# Patient Record
Sex: MALE | Race: WHITE | NOT HISPANIC OR LATINO | Employment: FULL TIME | URBAN - METROPOLITAN AREA
[De-identification: names, ages, dates, MRNs, and addresses within clinical notes are randomized per-mention and may not be internally consistent; named-entity substitution may affect disease eponyms.]

---

## 2017-01-20 ENCOUNTER — GENERIC CONVERSION - ENCOUNTER (OUTPATIENT)
Dept: OTHER | Facility: OTHER | Age: 52
End: 2017-01-20

## 2017-02-22 ENCOUNTER — GENERIC CONVERSION - ENCOUNTER (OUTPATIENT)
Dept: OTHER | Facility: OTHER | Age: 52
End: 2017-02-22

## 2017-05-18 ENCOUNTER — ALLSCRIPTS OFFICE VISIT (OUTPATIENT)
Dept: OTHER | Facility: OTHER | Age: 52
End: 2017-05-18

## 2017-05-18 DIAGNOSIS — Z13.29 ENCOUNTER FOR SCREENING FOR OTHER SUSPECTED ENDOCRINE DISORDER: ICD-10-CM

## 2017-05-18 DIAGNOSIS — Z13.220 ENCOUNTER FOR SCREENING FOR LIPOID DISORDERS: ICD-10-CM

## 2017-05-18 DIAGNOSIS — E29.1 TESTICULAR HYPOFUNCTION: ICD-10-CM

## 2017-08-15 ENCOUNTER — GENERIC CONVERSION - ENCOUNTER (OUTPATIENT)
Dept: OTHER | Facility: OTHER | Age: 52
End: 2017-08-15

## 2017-08-15 LAB
A/G RATIO (HISTORICAL): 1.8 (ref 1.2–2.2)
ALBUMIN SERPL BCP-MCNC: 4.4 G/DL (ref 3.5–5.5)
ALP SERPL-CCNC: 89 IU/L (ref 39–117)
ALT SERPL W P-5'-P-CCNC: 30 IU/L (ref 0–44)
AMBIG ABBREV CMP14 DEFAULT (HISTORICAL): NORMAL
AMBIG ABBREV LP DEFAULT (HISTORICAL): NORMAL
AST SERPL W P-5'-P-CCNC: 26 IU/L (ref 0–40)
BILIRUB SERPL-MCNC: 0.8 MG/DL (ref 0–1.2)
BUN SERPL-MCNC: 12 MG/DL (ref 6–24)
BUN/CREA RATIO (HISTORICAL): 13 (ref 9–20)
CALCIUM SERPL-MCNC: 9.4 MG/DL (ref 8.7–10.2)
CHLORIDE SERPL-SCNC: 99 MMOL/L (ref 96–106)
CHOLEST SERPL-MCNC: 156 MG/DL (ref 100–199)
CO2 SERPL-SCNC: 21 MMOL/L (ref 18–29)
CREAT SERPL-MCNC: 0.96 MG/DL (ref 0.76–1.27)
EGFR AFRICAN AMERICAN (HISTORICAL): 104 ML/MIN/1.73
EGFR-AMERICAN CALC (HISTORICAL): 90 ML/MIN/1.73
GLUCOSE SERPL-MCNC: 100 MG/DL (ref 65–99)
HDLC SERPL-MCNC: 51 MG/DL
LDL/HDL RATIO (HISTORICAL): 1.7 RATIO UNITS (ref 0–3.6)
LDLC SERPL CALC-MCNC: 87 MG/DL (ref 0–99)
POTASSIUM SERPL-SCNC: 3.8 MMOL/L (ref 3.5–5.2)
SODIUM SERPL-SCNC: 139 MMOL/L (ref 134–144)
TOT. GLOBULIN, SERUM (HISTORICAL): 2.5 G/DL (ref 1.5–4.5)
TOTAL PROTEIN (HISTORICAL): 6.9 G/DL (ref 6–8.5)
TRIGL SERPL-MCNC: 90 MG/DL (ref 0–149)
VLDLC SERPL CALC-MCNC: 18 MG/DL (ref 5–40)

## 2017-08-16 ENCOUNTER — ALLSCRIPTS OFFICE VISIT (OUTPATIENT)
Dept: OTHER | Facility: OTHER | Age: 52
End: 2017-08-16

## 2017-08-16 LAB — TSH SERPL DL<=0.05 MIU/L-ACNC: 2 UIU/ML (ref 0.45–4.5)

## 2017-08-17 ENCOUNTER — GENERIC CONVERSION - ENCOUNTER (OUTPATIENT)
Dept: OTHER | Facility: OTHER | Age: 52
End: 2017-08-17

## 2017-08-18 LAB
TESTOSTERONE FREE (HISTORICAL): 8.1 PG/ML (ref 7.2–24)
TESTOSTERONE TOTAL (HISTORICAL): 438.2 NG/DL (ref 264–916)

## 2017-08-21 ENCOUNTER — GENERIC CONVERSION - ENCOUNTER (OUTPATIENT)
Dept: OTHER | Facility: OTHER | Age: 52
End: 2017-08-21

## 2017-10-04 ENCOUNTER — GENERIC CONVERSION - ENCOUNTER (OUTPATIENT)
Dept: OTHER | Facility: OTHER | Age: 52
End: 2017-10-04

## 2018-01-12 NOTE — RESULT NOTES
Verified Results  (1) COMPREHENSIVE METABOLIC PANEL [id number] 53:43BH [facility]     Test Name Result Flag Reference   Glucose, Serum 100 mg/dL H 65-99   BUN 12 mg/dL  6-24   Creatinine, Serum 0 96 mg/dL  0 76-1 27   BUN/Creatinine Ratio 13  9-20   Sodium, Serum 139 mmol/L  134-144   Potassium, Serum 3 8 mmol/L  3 5-5 2   Chloride, Serum 99 mmol/L     Carbon Dioxide, Total 21 mmol/L  18-29   Calcium, Serum 9 4 mg/dL  8 7-10 2   Protein, Total, Serum 6 9 g/dL  6 0-8 5   Albumin, Serum 4 4 g/dL  3 5-5 5   Globulin, Total 2 5 g/dL  1 5-4 5   A/G Ratio 1 8  1 2-2 2   Bilirubin, Total 0 8 mg/dL  0 0-1 2   Alkaline Phosphatase, S 89 IU/L     AST (SGOT) 26 IU/L  0-40   ALT (SGPT) 30 IU/L  0-44   eGFR If NonAfricn Am 90 mL/min/1 73  >59   eGFR If Africn Am 104 mL/min/1 73  >59

## 2018-01-13 VITALS
WEIGHT: 216 LBS | HEART RATE: 78 BPM | HEIGHT: 69 IN | OXYGEN SATURATION: 98 % | BODY MASS INDEX: 31.99 KG/M2 | TEMPERATURE: 98.8 F

## 2018-01-13 NOTE — RESULT NOTES
Discussion/Summary   Please let the patient know that his testosterone level is normal      Verified Results  (1) TESTOSTERONE, FREE (DIRECT) AND TOTAL [id number] 08:10AM Gilford Falling courtesy copy of this report has been sent to  the patient  Test Name Result Flag Reference   Testosterone, Total, LC/ 2 ng/dL  264 0-916 0   This [facility] LC/MS-MS method is currently certified by the [facility]  Hormone Standardization Program (HoSt)  Adult male reference  interval is based on a population of healthy nonobese males  (BMI <30) between 23and 44years old  [address] [place] [address], [address] [place] [address]  [phone]  PMID: [id number]  This test was developed and its performance characteristics  determined by [facility]  It has not been cleared or approved  by the Food and Drug Administration     Free Testosterone(Direct) 8 1 pg/mL  7 2-24 0

## 2018-01-13 NOTE — PROGRESS NOTES
Chief Complaint  PATIENT IS HERE FOR HEP B VACCINE  ALL INFORMATION HAS BEEN DOCUMENTED  PATIENT TOLERATED WELL  Active Problems    1  Benign essential hypertension (401 1) (I10)   2  Depression (311) (F32 9)   3  Diabetes mellitus screening (V77 1) (Z13 1)   4  Diverticulosis (562 10) (K57 90)   5  Hemochromatosis type 1 (275 01) (E83 110)   6  Hypogonadism   7  Lipid screening (V77 91) (Z13 220)   8  Low testosterone level in male (257 2) (E29 1)   9  Need for hepatitis B vaccination (V05 3) (Z23)   10  Nicotine dependence (305 1) (F17 200)   11  Polycythemia vera (238 4) (D45)   12  Thyroid disorder screen (V77 0) (Z13 29)    Current Meds   1  AmLODIPine Besylate 5 MG Oral Tablet; Take one tablet daily; Therapy: (Recorded:58Kog3860) to Recorded   2  Chantix Continuing Month Dany 1 MG Oral Tablet; TAKE 1 TABLET TWICE DAILY; Therapy: 40WVQ7769 to (Evaluate:08Zba7158)  Requested for: 06ZJW3579; Last   Rx:93Zjc3744 Ordered   3  Chantix Starting Month Dany 0 5 MG X 11 & 1 MG X 42 Oral Tablet; TAKE AS DIRECTED   PER PACKAGE INSTRUCTIONS; Therapy: 32VKC8459 to (Last Rx:42Jcz6945)  Requested for: 11DOS2892 Ordered   4  Cialis 5 MG Oral Tablet; TAKE 1 TABLET DAILY 1 HOUR BEFORE NEEDED  Requested   for: 16ULS8315; Last Rx:41Swn0356 Ordered   5  DULoxetine HCl - 60 MG Oral Capsule Delayed Release Particles; TAKE ONE (1)   CAPSULE(S) DAILY; Therapy: (Flavio Hicks) to Recorded   6  HydroCHLOROthiazide 25 MG Oral Tablet; TAKE 1 TABLET DAILY; Therapy: (Recorded:66Mlc7538) to Recorded    Allergies    1  Penicillins    Plan  Need for hepatitis B vaccination    · Hepatitis B    Discussion/Summary    Hep b vaccine given        Signatures   Electronically signed by : Aysha Bautista DO; Aug 17 2017 12:09AM EST                       (Author)

## 2018-01-15 NOTE — MISCELLANEOUS
History of Present Illness  TCM Communication [facility]: The patient is being contacted for follow-up after hospitalization  He was hospitalized at [facility]  The date of admission: 10/04/2017, date of discharge: 10/05/2017  Diagnosis: Infection from stye  He was discharged to home  Medications were not reviewed today  Symptoms: wound drainage  The patient is currently experiencing symptoms  eye still red painfull, discharge   Communication performed and completed by Paty Owens      Active Problems    1  Benign essential hypertension (401 1) (I10)   2  Depression (311) (F32 9)   3  Diabetes mellitus screening (V77 1) (Z13 1)   4  Diverticulosis (562 10) (K57 90)   5  Hemochromatosis type 1 (275 01) (E83 110)   6  Hypogonadism   7  Lipid screening (V77 91) (Z13 220)   8  Low testosterone level in male (257 2) (E29 1)   9  Need for hepatitis B vaccination (V05 3) (Z23)   10  Nicotine dependence (305 1) (F17 200)   11  Polycythemia vera (238 4) (D45)   12  Thyroid disorder screen (V77 0) (Z13 29)    Past Medical History    1  History of Cubital tunnel syndrome, left (354 2) (G56 22)   2  History of Fracture of tibia with fibula, left, open (823 92) (S82 402B,S82 202B)   3  History of adenomatous polyp of colon (V12 72) (Z86 010)   4  History of temporomandibular joint disorder (V13 59) (Z87 39)   5  History of Skye-rectal abscess (566) (K61 1)    Surgical History    1  History of Ant Spinal Diskectomy, Osteophytectomy Lumbar Interspace   2  History of Arthroplasty Of Temporomandibular Joint   3  History of Knee Arthroplasty   4  History of Laminectomy Lumbar   5  History of Neuroplasty Median Nerve At Carpal Tunnel   6  History of Open Treat Weight Bearing Distal Tibial & Fibular Fracture   7  History of Orthopedic Surgery Bone Grafting    Family History  Father    1  Family history of diabetes mellitus (V18 0) (Z83 3)   2  Family history of hypertension (V17 49) (Z82 49)   3   Family history of Mitral valve prolapse  Paternal Grandfather    4  Family history of Colon cancer    Social History    · Alcohol use (V49 89) (Z78 9)   · Current every day smoker (305 1) (F17 200)   · Daily caffeine consumption, 2-3 servings a day   · Travel foreign (V49 89) (Z78 9)    Current Meds   1  AmLODIPine Besylate 5 MG Oral Tablet; Take one tablet daily; Therapy: (Recorded:41Ykd1824) to Recorded   2  Chantix Continuing Month Dany 1 MG Oral Tablet; TAKE 1 TABLET TWICE DAILY; Therapy: 64KTE3400 to (Evaluate:42Fwn7704)  Requested for: 29LLT7203; Last   Rx:82Mvk5827 Ordered   3  Chantix Starting Month Dany 0 5 MG X 11 & 1 MG X 42 Oral Tablet; TAKE AS DIRECTED PER   PACKAGE INSTRUCTIONS; Therapy: 23LJQ0545 to (Last Rx:13Sqn1710)  Requested for: 85LLN7730 Ordered   4  Cialis 5 MG Oral Tablet; TAKE 1 TABLET DAILY 1 HOUR BEFORE NEEDED  Requested   for: 69DKD2501; Last Rx:07Jun2017 Ordered   5  DULoxetine HCl - 60 MG Oral Capsule Delayed Release Particles; TAKE ONE (1)   CAPSULE(S) DAILY; Therapy: (Leland Leo) to Recorded   6  HydroCHLOROthiazide 25 MG Oral Tablet; TAKE 1 TABLET DAILY; Therapy: (Recorded:48Imb7345) to Recorded    Allergies    1  Penicillins    Health Management  Diverticulosis   COLONOSCOPY; every 5 years; Last 08Apr2014; Next Due: 08Apr2019; Active  Family history of Colon cancer   COLONOSCOPY; every 5 years; Last 08Apr2014; Next Due: 02Ksp3096; Active    Future Appointments    Date/Time Provider Specialty Site   10/12/2017 10:00 AM Claudette Bores, M D   Family Medicine [facility]     Signatures   Electronically signed by : JUAN JOSÉ Shi ; Oct 23 2017 10:02AM EST                       (Author)

## 2018-02-05 ENCOUNTER — TELEPHONE (OUTPATIENT)
Dept: FAMILY MEDICINE CLINIC | Facility: CLINIC | Age: 53
End: 2018-02-05

## 2018-02-05 DIAGNOSIS — I10 ESSENTIAL HYPERTENSION: Primary | ICD-10-CM

## 2018-02-06 RX ORDER — AMLODIPINE BESYLATE 10 MG/1
10 TABLET ORAL DAILY
Qty: 30 TABLET | Refills: 5 | Status: SHIPPED | OUTPATIENT
Start: 2018-02-06 | End: 2018-02-09 | Stop reason: SDUPTHER

## 2018-02-06 NOTE — TELEPHONE ENCOUNTER
So he does not think he told us the correct dose? All I know is what he told me unless we have old records in [facility]  The easiest way to sort this out might be to call his pharmacy where he was getting his medication filled and see what he was getting

## 2018-02-06 NOTE — TELEPHONE ENCOUNTER
SPOKE W/[facility] THEY ONLY HAVE HIM GETTING AMLODIPINE 5 MG, HOWEVER R/A IN [facility] SAID DR LOUIS DID PRESCRIBE 10 MG FOR IN 10/17  I DID LOOK BACK IN HIS PREVIOUS RECORD AND IT LOOKS LIKE HIS OLD PCP DID CHANGE IT TO 10 MG  IN 2014

## 2018-02-06 NOTE — TELEPHONE ENCOUNTER
PT CALLED QUESTIONING THE DOSE OF HIS AMLODIPINE  HE THINKS WHEN HE STARTED TO COME HERE HE TOLD US THE WRONG DOSE  PT THINKS HE SHOULD BE TAKING 10 MG NOT 5 Mg  PLEASE VERIFY THIS  PT IS NEED OF REFILLS  ADVISE PT

## 2018-02-08 ENCOUNTER — TELEPHONE (OUTPATIENT)
Dept: FAMILY MEDICINE CLINIC | Facility: CLINIC | Age: 53
End: 2018-02-08

## 2018-02-08 DIAGNOSIS — I10 ESSENTIAL HYPERTENSION: ICD-10-CM

## 2018-02-09 RX ORDER — AMLODIPINE BESYLATE 10 MG/1
10 TABLET ORAL DAILY
Qty: 30 TABLET | Refills: 5 | Status: SHIPPED | OUTPATIENT
Start: 2018-02-09 | End: 2018-03-15

## 2018-02-13 DIAGNOSIS — F33.9 EPISODE OF RECURRENT MAJOR DEPRESSIVE DISORDER, UNSPECIFIED DEPRESSION EPISODE SEVERITY (HCC): Primary | ICD-10-CM

## 2018-02-13 RX ORDER — DULOXETIN HYDROCHLORIDE 60 MG/1
CAPSULE, DELAYED RELEASE ORAL
Qty: 30 CAPSULE | Refills: 0 | Status: SHIPPED | OUTPATIENT
Start: 2018-02-13 | End: 2018-03-15 | Stop reason: SDUPTHER

## 2018-03-15 DIAGNOSIS — I10 ESSENTIAL HYPERTENSION: Primary | ICD-10-CM

## 2018-03-15 DIAGNOSIS — F33.9 EPISODE OF RECURRENT MAJOR DEPRESSIVE DISORDER, UNSPECIFIED DEPRESSION EPISODE SEVERITY (HCC): ICD-10-CM

## 2018-03-15 RX ORDER — TADALAFIL 5 MG
5 TABLET ORAL AS NEEDED
Refills: 5 | COMMUNITY
Start: 2018-02-17 | End: 2018-07-12

## 2018-03-15 RX ORDER — DULOXETIN HYDROCHLORIDE 60 MG/1
CAPSULE, DELAYED RELEASE ORAL
Qty: 30 CAPSULE | Refills: 2 | Status: SHIPPED | OUTPATIENT
Start: 2018-03-15 | End: 2018-05-26 | Stop reason: SDUPTHER

## 2018-03-15 RX ORDER — HYDROCHLOROTHIAZIDE 25 MG/1
TABLET ORAL
Qty: 30 TABLET | Refills: 2 | Status: SHIPPED | OUTPATIENT
Start: 2018-03-15 | End: 2018-05-26 | Stop reason: SDUPTHER

## 2018-03-15 RX ORDER — HYDROCHLOROTHIAZIDE 25 MG/1
25 TABLET ORAL DAILY
Refills: 2 | COMMUNITY
Start: 2018-02-13 | End: 2018-07-12 | Stop reason: SDUPTHER

## 2018-03-15 RX ORDER — AMLODIPINE BESYLATE 5 MG/1
5 TABLET ORAL DAILY
Refills: 3 | COMMUNITY
Start: 2018-01-16 | End: 2018-07-12

## 2018-03-15 NOTE — TELEPHONE ENCOUNTER
I received a refill request for the patient- I am refilling the medications but please call and remind him that he is due for a visit in May, his last visit was May 2017  He had labs in August, so he is not yet due for lab work

## 2018-05-26 DIAGNOSIS — F32.1 MODERATE MAJOR DEPRESSION (HCC): ICD-10-CM

## 2018-05-26 DIAGNOSIS — F33.9 EPISODE OF RECURRENT MAJOR DEPRESSIVE DISORDER, UNSPECIFIED DEPRESSION EPISODE SEVERITY (HCC): ICD-10-CM

## 2018-05-26 DIAGNOSIS — I10 ESSENTIAL HYPERTENSION: ICD-10-CM

## 2018-05-26 DIAGNOSIS — I10 BENIGN ESSENTIAL HYPERTENSION: Primary | ICD-10-CM

## 2018-05-29 PROBLEM — F17.200 NICOTINE DEPENDENCE: Status: ACTIVE | Noted: 2017-05-18

## 2018-05-29 PROBLEM — K57.90 DIVERTICULOSIS: Status: ACTIVE | Noted: 2017-05-23

## 2018-05-29 PROBLEM — D45 POLYCYTHEMIA VERA (HCC): Status: ACTIVE | Noted: 2017-05-18

## 2018-05-29 PROBLEM — F32.1 MODERATE MAJOR DEPRESSION (HCC): Status: ACTIVE | Noted: 2017-05-23

## 2018-05-29 PROBLEM — I10 BENIGN ESSENTIAL HYPERTENSION: Status: ACTIVE | Noted: 2017-05-23

## 2018-05-29 PROBLEM — R79.89 LOW TESTOSTERONE LEVEL IN MALE: Status: ACTIVE | Noted: 2017-05-18

## 2018-05-29 PROBLEM — E83.110: Status: ACTIVE | Noted: 2017-05-18

## 2018-05-29 PROBLEM — IMO0002 HYPOGONADISM: Status: ACTIVE | Noted: 2017-05-23

## 2018-05-29 PROBLEM — F32.A DEPRESSION: Status: ACTIVE | Noted: 2017-05-23

## 2018-05-29 RX ORDER — DULOXETIN HYDROCHLORIDE 60 MG/1
CAPSULE, DELAYED RELEASE ORAL
Qty: 30 CAPSULE | Refills: 0 | Status: SHIPPED | OUTPATIENT
Start: 2018-05-29 | End: 2018-07-09 | Stop reason: SDUPTHER

## 2018-05-29 RX ORDER — HYDROCHLOROTHIAZIDE 25 MG/1
TABLET ORAL
Qty: 30 TABLET | Refills: 0 | Status: SHIPPED | OUTPATIENT
Start: 2018-05-29 | End: 2018-07-12

## 2018-05-29 NOTE — TELEPHONE ENCOUNTER
Please let the patient know that I have received a refill request for his to medications  I did put these through for a 1 month supply but he has not been seen in the office in over a year now, his visit was 5/17/2018  He does need a routine physical

## 2018-07-09 DIAGNOSIS — F33.9 EPISODE OF RECURRENT MAJOR DEPRESSIVE DISORDER, UNSPECIFIED DEPRESSION EPISODE SEVERITY (HCC): ICD-10-CM

## 2018-07-09 RX ORDER — DULOXETIN HYDROCHLORIDE 60 MG/1
CAPSULE, DELAYED RELEASE ORAL
Qty: 30 CAPSULE | Refills: 0 | Status: SHIPPED | OUTPATIENT
Start: 2018-07-09 | End: 2018-07-12 | Stop reason: SDUPTHER

## 2018-07-12 ENCOUNTER — OFFICE VISIT (OUTPATIENT)
Dept: FAMILY MEDICINE CLINIC | Facility: CLINIC | Age: 53
End: 2018-07-12
Payer: COMMERCIAL

## 2018-07-12 VITALS
HEART RATE: 67 BPM | TEMPERATURE: 97.9 F | SYSTOLIC BLOOD PRESSURE: 130 MMHG | HEIGHT: 68 IN | DIASTOLIC BLOOD PRESSURE: 60 MMHG | OXYGEN SATURATION: 95 % | WEIGHT: 212.75 LBS | BODY MASS INDEX: 32.24 KG/M2

## 2018-07-12 DIAGNOSIS — D45 POLYCYTHEMIA VERA (HCC): ICD-10-CM

## 2018-07-12 DIAGNOSIS — Z23 NEED FOR TDAP VACCINATION: Primary | ICD-10-CM

## 2018-07-12 DIAGNOSIS — Z11.59 ENCOUNTER FOR HEPATITIS C SCREENING TEST FOR LOW RISK PATIENT: ICD-10-CM

## 2018-07-12 DIAGNOSIS — I10 BENIGN ESSENTIAL HYPERTENSION: ICD-10-CM

## 2018-07-12 DIAGNOSIS — Z12.5 SCREENING PSA (PROSTATE SPECIFIC ANTIGEN): ICD-10-CM

## 2018-07-12 DIAGNOSIS — Z00.00 ROUTINE ADULT HEALTH MAINTENANCE: ICD-10-CM

## 2018-07-12 DIAGNOSIS — F17.213 CIGARETTE NICOTINE DEPENDENCE WITH WITHDRAWAL: ICD-10-CM

## 2018-07-12 DIAGNOSIS — N52.1 ERECTILE DYSFUNCTION DUE TO DISEASES CLASSIFIED ELSEWHERE: ICD-10-CM

## 2018-07-12 DIAGNOSIS — F33.9 EPISODE OF RECURRENT MAJOR DEPRESSIVE DISORDER, UNSPECIFIED DEPRESSION EPISODE SEVERITY (HCC): ICD-10-CM

## 2018-07-12 DIAGNOSIS — Z13.220 SCREENING, LIPID: ICD-10-CM

## 2018-07-12 PROCEDURE — 90471 IMMUNIZATION ADMIN: CPT

## 2018-07-12 PROCEDURE — 99396 PREV VISIT EST AGE 40-64: CPT | Performed by: FAMILY MEDICINE

## 2018-07-12 PROCEDURE — 90715 TDAP VACCINE 7 YRS/> IM: CPT

## 2018-07-12 RX ORDER — HYDROCHLOROTHIAZIDE 25 MG/1
25 TABLET ORAL DAILY
Qty: 30 TABLET | Refills: 5 | Status: SHIPPED | OUTPATIENT
Start: 2018-07-12 | End: 2018-12-10 | Stop reason: SDUPTHER

## 2018-07-12 RX ORDER — AMLODIPINE BESYLATE 10 MG/1
10 TABLET ORAL DAILY
Qty: 30 TABLET | Refills: 5 | Status: SHIPPED | OUTPATIENT
Start: 2018-07-12 | End: 2019-01-02 | Stop reason: SDUPTHER

## 2018-07-12 RX ORDER — VARENICLINE TARTRATE 1 MG/1
1 TABLET, FILM COATED ORAL 2 TIMES DAILY
Qty: 60 TABLET | Refills: 2 | Status: SHIPPED | OUTPATIENT
Start: 2018-07-12 | End: 2019-01-02 | Stop reason: SDUPTHER

## 2018-07-12 RX ORDER — DULOXETIN HYDROCHLORIDE 60 MG/1
60 CAPSULE, DELAYED RELEASE ORAL DAILY
Qty: 30 CAPSULE | Refills: 5 | Status: SHIPPED | OUTPATIENT
Start: 2018-07-12 | End: 2018-12-10 | Stop reason: SDUPTHER

## 2018-07-12 RX ORDER — TADALAFIL 10 MG/1
TABLET ORAL
COMMUNITY
End: 2018-07-12 | Stop reason: SDUPTHER

## 2018-07-12 RX ORDER — SODIUM, POTASSIUM,MAG SULFATES 17.5-3.13G
SOLUTION, RECONSTITUTED, ORAL ORAL
Refills: 0 | COMMUNITY
Start: 2018-05-26 | End: 2018-07-12

## 2018-07-12 RX ORDER — AMLODIPINE BESYLATE 10 MG/1
10 TABLET ORAL DAILY
Refills: 5 | COMMUNITY
Start: 2018-06-09 | End: 2018-07-12 | Stop reason: SDUPTHER

## 2018-07-12 RX ORDER — TADALAFIL 10 MG/1
10 TABLET ORAL DAILY PRN
Qty: 10 TABLET | Refills: 5 | Status: SHIPPED | OUTPATIENT
Start: 2018-07-12 | End: 2018-07-17 | Stop reason: SDUPTHER

## 2018-07-12 NOTE — PROGRESS NOTES
Gail Quiles is a 46 y o   male and is here for routine health maintenance  The patient reports no problems  History of Present Illness     HPI    Well Adult Physical   Patient here for a comprehensive physical exam       Diet and Physical Activity  Diet: well balanced diet for the most part, does not eat a lot of junk or fast food  Weight concerns: Patient has class 1 obesity (BMI 30-34  9)  Exercise: rarely      Depression Screen  PHQ-9 Depression Screening    PHQ-9:    Frequency of the following problems over the past two weeks:               General Health  Hearing: Normal:  bilateral  Vision: vision problems: reading mostly, saw optho about a year ago  Dental: regular dental visits, brushes teeth twice daily and has invisalign so goes regularly      Cancer Screening  Colononoscopy UTD  PSA UTD    Smoker - started chantix at home, needs a refill for the continuing dose     The following portions of the patient's history were reviewed and updated as appropriate: allergies, current medications, past family history, past medical history, past social history, past surgical history and problem list     Review of Systems     Review of Systems   Constitutional: Negative for chills, fever and unexpected weight change  HENT: Negative for congestion, ear pain, hearing loss, postnasal drip, rhinorrhea and sore throat  Respiratory: Negative for cough and shortness of breath  Cardiovascular: Negative for chest pain  Gastrointestinal: Negative for abdominal pain, constipation and diarrhea  Genitourinary: Negative for difficulty urinating  Some nocturia   Musculoskeletal: Negative for arthralgias and gait problem  Skin: Negative for rash  Neurological: Negative for light-headedness and headaches  Psychiatric/Behavioral: Negative for dysphoric mood and sleep disturbance  The patient is not nervous/anxious          Past Medical History     Past Medical History:   Diagnosis Date  Adenomatous polyp of colon     last assessed 05/23/17    Cubital tunnel syndrome     left,last assessed 05/23/17    Skye-rectal abscess     last assessed 05/23/17    Temporomandibular joint disorder     last assessed 05/23/17       Past Surgical History     Past Surgical History:   Procedure Laterality Date    ARTHROPLASTY      Temporomandibular joint last assessed 05/23/17    KNEE ARTHROPLASTY      last assessed 05/23/17    LUMBAR DISC SURGERY      Osteophytectomy lumbar interspace last assessed 05/18/2017    LUMBAR LAMINECTOMY      last assessed 05/18/17    NEUROPLASTY / TRANSPOSITION MEDIAN NERVE AT CARPAL TUNNEL      last assessed 05/23/17    ORTHOPEDIC SURGERY      bone grafting,last assessed 05/23/17    OTHER SURGICAL HISTORY      open treat weight bearing distal tibial fibular fracture, last assessed 05/18/17       Social History     Social History     Social History    Marital status: /Civil Union     Spouse name: N/A    Number of children: N/A    Years of education: N/A     Social History Main Topics    Smoking status: Current Every Day Smoker    Smokeless tobacco: Not on file    Alcohol use Yes    Drug use: Unknown    Sexual activity: Not on file     Other Topics Concern    Not on file     Social History Narrative    Daily caffeine consumption 2 servings per day    Travel foreign       Family History     Family History   Problem Relation Age of Onset    Diabetes Father     Hypertension Father     Mitral valve prolapse Father     Colon cancer Paternal Grandfather        Current Medications       Current Outpatient Prescriptions:     amLODIPine (NORVASC) 10 mg tablet, Take 1 tablet (10 mg total) by mouth daily, Disp: 30 tablet, Rfl: 5    DULoxetine (CYMBALTA) 60 mg delayed release capsule, Take 1 capsule (60 mg total) by mouth daily, Disp: 30 capsule, Rfl: 5    hydrochlorothiazide (HYDRODIURIL) 25 mg tablet, Take 1 tablet (25 mg total) by mouth daily, Disp: 30 tablet, Rfl: 5    tadalafil (CIALIS) 10 MG tablet, Take 1 tablet (10 mg total) by mouth daily as needed for erectile dysfunction, Disp: 10 tablet, Rfl: 5    varenicline (CHANTIX) 1 mg tablet, Take 1 tablet (1 mg total) by mouth 2 (two) times a day, Disp: 60 tablet, Rfl: 2     Allergies     Allergies   Allergen Reactions    Penicillins Rash and Hives       Objective     /60   Pulse 67   Temp 97 9 °F (36 6 °C)   Ht 5' 8" (1 727 m)   Wt 96 5 kg (212 lb 12 oz)   SpO2 95%   BMI 32 35 kg/m²      Physical Exam   Constitutional: He is oriented to person, place, and time  He appears well-developed and well-nourished  HENT:   Head: Normocephalic and atraumatic  Right Ear: External ear normal    Left Ear: External ear normal    Nose: Nose normal    Mouth/Throat: Oropharynx is clear and moist    Eyes: Conjunctivae and EOM are normal  Pupils are equal, round, and reactive to light  Neck: Normal range of motion  Neck supple  No thyroid mass and no thyromegaly present  Cardiovascular: Normal rate, regular rhythm and normal heart sounds  Pulmonary/Chest: Effort normal and breath sounds normal    Abdominal: Normal appearance  Musculoskeletal: Normal range of motion  He exhibits no edema  Lymphadenopathy:     He has no cervical adenopathy  Right: No supraclavicular adenopathy present  Neurological: He is alert and oriented to person, place, and time  Skin: Skin is warm, dry and intact  Psychiatric: He has a normal mood and affect  His behavior is normal  Judgment and thought content normal    Nursing note and vitals reviewed          No exam data present    Health Maintenance     Health Maintenance   Topic Date Due    HIV SCREENING  1965    Hepatitis C Screening  1965    DTaP,Tdap,and Td Vaccines (2 - Td) 01/01/2018    INFLUENZA VACCINE  09/01/2018    CRC Screening: Colonoscopy  06/05/2023     Immunization History   Administered Date(s) Administered    Hep A, adult 01/11/2017    Hep B, adult 01/11/2017, 04/05/2017, 08/16/2017    Tdap 01/01/2008    Typhoid, Unspecified 01/11/2017    Typhoid, ViCPs 01/11/2017       Assessment/Plan       1  Healthy male exam   2  Patient Counseling:   · Nutrition: Stressed importance of a well balanced diet, moderation of sodium/saturated fat, caloric balance and sufficient intake of fiber  · Exercise: Stressed the importance of regular exercise with a goal of 150 minutes per week  · Dental Health: Discussed daily flossing and brushing and regular dental visits     · Immunizations reviewed  · Discussed benefits of screening   · Discussed the patient's BMI with him  The BMI is above average; BMI management plan is completed  3  Cancer Screening UTD  4  Labs ordered  5  Smoking cessation - rxd chantix 1mg twice a day; also refilled all meds  6  Follow up next physical in 1 year      Sue Whyte MD

## 2018-07-12 NOTE — PATIENT INSTRUCTIONS
Polycythemia Vera   AMBULATORY CARE:   Polycythemia vera (PV)  is a condition that causes your bone marrow to produce too many red blood cells (RBCs)  RBCs carry oxygen throughout the body  Too many white blood cells (WBCs) and platelets may also be produced  The extra blood cells make your blood thicker than normal  Blood that is too thick cannot flow easily, so less oxygen is delivered to your body's tissues  Left untreated, PV is life-threatening  PV is usually caused by a gene mutation (change)  Your risk for PV increases if you are male or older than 50 years  Common signs and symptoms of PV:  Signs and symptoms develop slowly, over many years  You may have any of the following:  · Chest pain or discomfort    · Fatigue, weakness, or weight loss without trying    · Shortness of breath, or trouble breathing when you lie down    · Pressure on the left side of your abdomen, abdominal pain, or diarrhea    · Bulging veins, or blue or purple skin on your face or mucus membranes    · Itching that may be intense, or numbness or burning pain in your feet or hands    · High blood pressure, headaches, dizziness, or blurred vision    · Blood clots, bruising, and bleeding problems    · Pain and swelling in a joint, usually in a big toe, or bone pain  Call 911 for any of the following:   · You have any of the following signs of a heart attack:      ¨ Squeezing, pressure, or pain in your chest that lasts longer than 5 minutes or returns    ¨ Discomfort or pain in your back, neck, jaw, stomach, or arm     ¨ Trouble breathing    ¨ Nausea or vomiting    ¨ Lightheadedness or a sudden cold sweat, especially with chest pain or trouble breathing    · You have any of the following signs of a stroke:      ¨ Numbness or drooping on one side of your face     ¨ Weakness in an arm or leg    ¨ Confusion or difficulty speaking    ¨ Dizziness, a severe headache, or vision loss    · You have a severe headache or a seizure       · You cough up blood   Seek care immediately if:   · Your arm or leg feels warm, tender, and painful  It may look swollen and red  · You have new or worsening symptoms  · You have heavy bleeding that does not stop after 20 minutes of applied pressure  Contact your healthcare provider if:   · Your nose or gums bleed  · You see blood in your urine  · You have a sore or skin changes on your hands or feet, or under your compression stocking  · You have more bruises than usual      · You have questions or concerns about your condition or care  Treatment:  PV cannot be cured  It will always need to be managed  The goal of treatment is improve symptoms and reduce the risk for problems such as blood clots  · Phlebotomy  is a procedure used to remove blood from your body  About a pint of blood is removed during a session  The procedure lowers the number of RBCs and thins the blood  Phlebotomy may be done every few days to every few months  · Medicines  may be given to thin your blood  This will help reduce blood clots  Medicines may also be given to reduce itching or uric acid, or to control stomach acid  Medicine is sometimes used to make bone marrow create fewer RBCs  · Aspirin  can help thin your blood, and relive bone pain and burning in your hands or feet  Aspirin can cause stomach bleeding in some people  Only take aspirin if directed by your healthcare provider  Do not take more than the recommended amount  · Radiation  is a procedure used to stop bone marrow cells from producing too many RBCs  Prevent blood clots:   · Do not smoke  Nicotine and other chemicals in cigarettes and cigars can increase your risk for blood clots and cause lung damage  Ask your healthcare provider for information if you currently smoke and need help to quit  E-cigarettes or smokeless tobacco still contain nicotine  Talk to your healthcare provider before you use these products  · Help keep your blood flowing    Wear support stockings as directed  Support stockings are tight and help push blood up and out of your leg veins  Elevate your feet when you sit  This will help prevent blood from pooling in your leg veins  · Exercise as directed  Exercise such as walking helps improve blood flow and prevents blood clots  Ask your healthcare provider which exercises are best for you  Stop if you feel any chest pain or shortness of breath  · Drink liquids as directed  Liquids help keep your blood thin  Your healthcare provider may recommend that you drink at least 3 liters (12 cups) of liquid each day  Ask which liquids are best for you  Manage your symptoms:   · Prevent bleeding and bruising  PV or blood thinners used to manage it can increase your risk for heavy bleeding and easy bruising  Use an electric razor and soft toothbrush  Floss your teeth gently  Do not play contact sports, such as football  These sports increase the risk for bruising  Get care immediately if you are injured  Ask about other ways to prevent bleeding and bruising  Tell all healthcare providers that you have PV or are taking blood thinners  · Protect your hands and feet  Wear gloves outside if the temperature is low  Check your hands and feet for sores caused by blood circulation problems  You may not feel the sores because of the low blood flow  · Avoid high temperatures  Do not take hot baths or sit in hot tubs  The heat can increase symptoms such as facial flushing and skin itching  Protect your skin when you are outside in hot weather  Do not use a tanning bed or sun lamp  These can damage your skin  · Take cool baths to relive itching  Itching can increase after a hot bath  Cool water may help relive itching  You may want to use oatmeal, corn starch, or a mild moisturizing soap to relieve dryness or itching  Lotion may also help relieve dry skin  Follow up with your healthcare provider as directed:   You may need ongoing tests or treatment  Write down your questions so you remember to ask them during your visits  © 2017 [employer] Information is for End User's use only and may not be sold, redistributed or otherwise used for commercial purposes  All illustrations and images included in CareNotes® are the copyrighted property of A [employer] Inc  or Brandan Sanchez  The above information is an  only  It is not intended as medical advice for individual conditions or treatments  Talk to your doctor, nurse or pharmacist before following any medical regimen to see if it is safe and effective for you

## 2018-07-17 DIAGNOSIS — N52.1 ERECTILE DYSFUNCTION DUE TO DISEASES CLASSIFIED ELSEWHERE: ICD-10-CM

## 2018-07-17 RX ORDER — TADALAFIL 5 MG/1
5 TABLET ORAL DAILY PRN
Qty: 30 TABLET | Refills: 5 | Status: SHIPPED | OUTPATIENT
Start: 2018-07-17 | End: 2019-01-02 | Stop reason: SDUPTHER

## 2018-07-17 NOTE — TELEPHONE ENCOUNTER
From: Ike Henderson  Sent: 7/17/2018 12:10 PM EDT  Subject: Medication Renewal Request    Ike Henderson would like a refill of the following medications:     tadalafil (CIALIS) 10 MG tablet Arlen Blair MD]   Patient Comment: This is not the correct dosage, I was taking 5 mg  daily  Also my insurance company is saying they need authiorization       Preferred pharmacy: [facility]Y #[id number] : [phone]

## 2018-07-20 PROBLEM — R79.89 LOW TESTOSTERONE LEVEL IN MALE: Status: RESOLVED | Noted: 2017-05-18 | Resolved: 2018-07-20

## 2018-12-10 DIAGNOSIS — I10 BENIGN ESSENTIAL HYPERTENSION: ICD-10-CM

## 2018-12-10 DIAGNOSIS — F33.9 EPISODE OF RECURRENT MAJOR DEPRESSIVE DISORDER, UNSPECIFIED DEPRESSION EPISODE SEVERITY (HCC): ICD-10-CM

## 2018-12-10 RX ORDER — DULOXETIN HYDROCHLORIDE 60 MG/1
60 CAPSULE, DELAYED RELEASE ORAL DAILY
Qty: 30 CAPSULE | Refills: 0 | Status: SHIPPED | OUTPATIENT
Start: 2018-12-10 | End: 2018-12-13 | Stop reason: SDUPTHER

## 2018-12-10 RX ORDER — HYDROCHLOROTHIAZIDE 25 MG/1
25 TABLET ORAL DAILY
Qty: 30 TABLET | Refills: 0 | Status: SHIPPED | OUTPATIENT
Start: 2018-12-10 | End: 2018-12-13 | Stop reason: SDUPTHER

## 2018-12-12 ENCOUNTER — TELEPHONE (OUTPATIENT)
Dept: OTHER | Facility: OTHER | Age: 53
End: 2018-12-12

## 2018-12-12 DIAGNOSIS — F33.9 EPISODE OF RECURRENT MAJOR DEPRESSIVE DISORDER, UNSPECIFIED DEPRESSION EPISODE SEVERITY (HCC): ICD-10-CM

## 2018-12-12 NOTE — TELEPHONE ENCOUNTER
Left message for patient that he is in need of an appointment  Pharmacy is requesting 90  I did pend it for 30 since he does need an appointment

## 2018-12-12 NOTE — TELEPHONE ENCOUNTER
"Patient needs a refill on Hydrochlorothiazide 25mg tablets 90 day and Duloxetine 60mg capsules 90 days  "

## 2018-12-13 DIAGNOSIS — F33.9 EPISODE OF RECURRENT MAJOR DEPRESSIVE DISORDER, UNSPECIFIED DEPRESSION EPISODE SEVERITY (HCC): ICD-10-CM

## 2018-12-13 DIAGNOSIS — I10 BENIGN ESSENTIAL HYPERTENSION: ICD-10-CM

## 2018-12-13 RX ORDER — DULOXETIN HYDROCHLORIDE 60 MG/1
60 CAPSULE, DELAYED RELEASE ORAL DAILY
Qty: 90 CAPSULE | Refills: 0 | Status: SHIPPED | OUTPATIENT
Start: 2018-12-13 | End: 2019-01-02 | Stop reason: CLARIF

## 2018-12-13 RX ORDER — DULOXETIN HYDROCHLORIDE 60 MG/1
60 CAPSULE, DELAYED RELEASE ORAL DAILY
Qty: 90 CAPSULE | Refills: 0 | Status: SHIPPED | OUTPATIENT
Start: 2018-12-13 | End: 2019-01-02 | Stop reason: SDUPTHER

## 2018-12-13 RX ORDER — HYDROCHLOROTHIAZIDE 25 MG/1
25 TABLET ORAL DAILY
Qty: 90 TABLET | Refills: 0 | Status: SHIPPED | OUTPATIENT
Start: 2018-12-13 | End: 2019-01-02 | Stop reason: SDUPTHER

## 2018-12-13 NOTE — PROGRESS NOTES
Please let the patient know that I refilled both of his medications but that he is due for a visit in January, his last visit was in July  This will be a check of his chronic conditions including a blood pressure check

## 2018-12-18 LAB
ALBUMIN SERPL-MCNC: 4.5 G/DL (ref 3.5–5.5)
ALBUMIN/GLOB SERPL: 1.8 {RATIO} (ref 1.2–2.2)
ALP SERPL-CCNC: 86 IU/L (ref 39–117)
ALT SERPL-CCNC: 32 IU/L (ref 0–44)
AST SERPL-CCNC: 34 IU/L (ref 0–40)
BASOPHILS # BLD AUTO: 0 X10E3/UL (ref 0–0.2)
BASOPHILS NFR BLD AUTO: 1 %
BILIRUB SERPL-MCNC: 1.2 MG/DL (ref 0–1.2)
BUN SERPL-MCNC: 14 MG/DL (ref 6–24)
BUN/CREAT SERPL: 13 (ref 9–20)
CALCIUM SERPL-MCNC: 9.5 MG/DL (ref 8.7–10.2)
CHLORIDE SERPL-SCNC: 95 MMOL/L (ref 96–106)
CHOLEST SERPL-MCNC: 164 MG/DL (ref 100–199)
CO2 SERPL-SCNC: 24 MMOL/L (ref 20–29)
CREAT SERPL-MCNC: 1.09 MG/DL (ref 0.76–1.27)
EOSINOPHIL # BLD AUTO: 0.1 X10E3/UL (ref 0–0.4)
EOSINOPHIL NFR BLD AUTO: 1 %
ERYTHROCYTE [DISTWIDTH] IN BLOOD BY AUTOMATED COUNT: 13.7 % (ref 12.3–15.4)
GLOBULIN SER-MCNC: 2.5 G/DL (ref 1.5–4.5)
GLUCOSE SERPL-MCNC: 100 MG/DL (ref 65–99)
HCT VFR BLD AUTO: 52.2 % (ref 37.5–51)
HCV AB S/CO SERPL IA: 0.1 S/CO RATIO (ref 0–0.9)
HDLC SERPL-MCNC: 61 MG/DL
HGB BLD-MCNC: 19.3 G/DL (ref 13–17.7)
IMM GRANULOCYTES # BLD: 0 X10E3/UL (ref 0–0.1)
IMM GRANULOCYTES NFR BLD: 0 %
IRON SERPL-MCNC: 214 UG/DL (ref 38–169)
LDLC SERPL CALC-MCNC: 88 MG/DL (ref 0–99)
LDLC/HDLC SERPL: 1.4 RATIO (ref 0–3.6)
LYMPHOCYTES # BLD AUTO: 1.5 X10E3/UL (ref 0.7–3.1)
LYMPHOCYTES NFR BLD AUTO: 23 %
MCH RBC QN AUTO: 35.2 PG (ref 26.6–33)
MCHC RBC AUTO-ENTMCNC: 37 G/DL (ref 31.5–35.7)
MCV RBC AUTO: 95 FL (ref 79–97)
MONOCYTES # BLD AUTO: 0.6 X10E3/UL (ref 0.1–0.9)
MONOCYTES NFR BLD AUTO: 9 %
MORPHOLOGY BLD-IMP: ABNORMAL
NEUTROPHILS # BLD AUTO: 4.1 X10E3/UL (ref 1.4–7)
NEUTROPHILS NFR BLD AUTO: 66 %
PLATELET # BLD AUTO: 160 X10E3/UL (ref 150–379)
POTASSIUM SERPL-SCNC: 3.2 MMOL/L (ref 3.5–5.2)
PROT SERPL-MCNC: 7 G/DL (ref 6–8.5)
PSA SERPL DL<=0.01 NG/ML-MCNC: 1.07 NG/ML (ref 0–4)
RBC # BLD AUTO: 5.49 X10E6/UL (ref 4.14–5.8)
SL AMB EGFR AFRICAN AMERICAN: 88 ML/MIN/1.73
SL AMB EGFR NON AFRICAN AMERICAN: 77 ML/MIN/1.73
SL AMB VLDL CHOLESTEROL CALC: 15 MG/DL (ref 5–40)
SODIUM SERPL-SCNC: 137 MMOL/L (ref 134–144)
TRIGL SERPL-MCNC: 74 MG/DL (ref 0–149)
TSH SERPL DL<=0.005 MIU/L-ACNC: 1.51 UIU/ML (ref 0.45–4.5)
WBC # BLD AUTO: 6.3 X10E3/UL (ref 3.4–10.8)

## 2019-01-02 ENCOUNTER — OFFICE VISIT (OUTPATIENT)
Dept: FAMILY MEDICINE CLINIC | Facility: CLINIC | Age: 54
End: 2019-01-02
Payer: COMMERCIAL

## 2019-01-02 VITALS
HEART RATE: 98 BPM | SYSTOLIC BLOOD PRESSURE: 114 MMHG | DIASTOLIC BLOOD PRESSURE: 62 MMHG | OXYGEN SATURATION: 96 % | WEIGHT: 215 LBS | BODY MASS INDEX: 32.58 KG/M2 | TEMPERATURE: 98.5 F | HEIGHT: 68 IN

## 2019-01-02 DIAGNOSIS — N52.1 ERECTILE DYSFUNCTION DUE TO DISEASES CLASSIFIED ELSEWHERE: ICD-10-CM

## 2019-01-02 DIAGNOSIS — I10 BENIGN ESSENTIAL HYPERTENSION: Primary | ICD-10-CM

## 2019-01-02 DIAGNOSIS — F17.213 CIGARETTE NICOTINE DEPENDENCE WITH WITHDRAWAL: ICD-10-CM

## 2019-01-02 DIAGNOSIS — E83.110 HEMOCHROMATOSIS TYPE 1 (HCC): ICD-10-CM

## 2019-01-02 DIAGNOSIS — F32.1 MODERATE MAJOR DEPRESSION (HCC): ICD-10-CM

## 2019-01-02 DIAGNOSIS — L20.84 INTRINSIC ECZEMA: ICD-10-CM

## 2019-01-02 DIAGNOSIS — F33.9 EPISODE OF RECURRENT MAJOR DEPRESSIVE DISORDER, UNSPECIFIED DEPRESSION EPISODE SEVERITY (HCC): ICD-10-CM

## 2019-01-02 DIAGNOSIS — D45 POLYCYTHEMIA VERA (HCC): ICD-10-CM

## 2019-01-02 PROCEDURE — 3074F SYST BP LT 130 MM HG: CPT | Performed by: FAMILY MEDICINE

## 2019-01-02 PROCEDURE — 99214 OFFICE O/P EST MOD 30 MIN: CPT | Performed by: FAMILY MEDICINE

## 2019-01-02 PROCEDURE — 3078F DIAST BP <80 MM HG: CPT | Performed by: FAMILY MEDICINE

## 2019-01-02 PROCEDURE — 3008F BODY MASS INDEX DOCD: CPT | Performed by: FAMILY MEDICINE

## 2019-01-02 RX ORDER — TADALAFIL 5 MG/1
5 TABLET ORAL DAILY PRN
Qty: 90 TABLET | Refills: 1 | Status: SHIPPED | OUTPATIENT
Start: 2019-01-02 | End: 2019-05-12 | Stop reason: SDUPTHER

## 2019-01-02 RX ORDER — HYDROCHLOROTHIAZIDE 25 MG/1
25 TABLET ORAL DAILY
Qty: 90 TABLET | Refills: 1 | Status: SHIPPED | OUTPATIENT
Start: 2019-01-02 | End: 2020-01-14 | Stop reason: ALTCHOICE

## 2019-01-02 RX ORDER — AMLODIPINE BESYLATE 10 MG/1
10 TABLET ORAL DAILY
Qty: 90 TABLET | Refills: 1 | Status: SHIPPED | OUTPATIENT
Start: 2019-01-02 | End: 2019-05-12 | Stop reason: SDUPTHER

## 2019-01-02 RX ORDER — VARENICLINE TARTRATE 1 MG/1
1 TABLET, FILM COATED ORAL 2 TIMES DAILY
Qty: 180 TABLET | Refills: 0 | Status: SHIPPED | OUTPATIENT
Start: 2019-01-02 | End: 2019-08-26

## 2019-01-02 RX ORDER — DULOXETIN HYDROCHLORIDE 60 MG/1
60 CAPSULE, DELAYED RELEASE ORAL DAILY
Qty: 90 CAPSULE | Refills: 1 | Status: SHIPPED | OUTPATIENT
Start: 2019-01-02 | End: 2019-05-12 | Stop reason: SDUPTHER

## 2019-01-02 RX ORDER — DOXYCYCLINE HYCLATE 50 MG/1
50 CAPSULE ORAL DAILY
Refills: 1 | COMMUNITY
Start: 2018-12-01 | End: 2019-01-02

## 2019-01-02 NOTE — PROGRESS NOTES
Subjective:   Chief Complaint   Patient presents with    Medication Management     need refills on all prescribed medications to be sent to [facility] in [facility] on [address]  States all meds are working well, no issues  Patient ID: Fabian Adhikari is a 46 y o  male  The patient is here today to follow-up on his chronic medical issues  He has no current complaints today  He had labs done in December that were all normal except for elevated iron an elevated hemoglobin consistent with his polycythemia vera  He used to see a hematologist for this and have phlebotomy on a regular basis but has not done so in a year  He is seen a dermatologist for lesions on his forehead and cheeks  They were biopsied and he was diagnosed with eczema  They have tried multiple creams and now doxycycline none of which have helped  He needs to make a follow-up with the dermatologist  He also saw ENT-he was told he had an inner ear infection  He was placed on antibiotics but is not sure that they did anything  He never really had significant pain, just a fullness behind the eardrums  He still has that fullness at times  He was given a steroid nasal spray but is not using it as it makes everything tastes funny-it was a prescription, he thinks fluticasone      Hypertension   This is a recurrent problem  The current episode started more than 1 year ago  The problem is unchanged  The problem is controlled  Pertinent negatives include no anxiety, blurred vision, chest pain, headaches, malaise/fatigue, neck pain, orthopnea, palpitations, peripheral edema, PND, shortness of breath or sweats  There are no associated agents to hypertension  Risk factors for coronary artery disease include smoking/tobacco exposure and stress  There are no compliance problems          The following portions of the patient's history were reviewed and updated as appropriate: allergies, current medications, past family history, past medical history, past social history, past surgical history and problem list     Review of Systems   Constitutional: Negative for malaise/fatigue  Eyes: Negative for blurred vision  Respiratory: Negative for shortness of breath  Cardiovascular: Negative for chest pain, palpitations, orthopnea and PND  Musculoskeletal: Negative for neck pain  Neurological: Negative for headaches  Objective:  Vitals:    01/02/19 1450   BP: 114/62   Pulse: 98   Temp: 98 5 °F (36 9 °C)   SpO2: 96%   Weight: 97 5 kg (215 lb)   Height: 5' 8" (1 727 m)      Physical Exam   Constitutional: He is oriented to person, place, and time  He appears well-developed and well-nourished  HENT:   Head: Normocephalic and atraumatic  Right Ear: External ear normal    Left Ear: External ear normal    Nose: Nose normal    Mouth/Throat: Oropharynx is clear and moist    Eyes: Pupils are equal, round, and reactive to light  Conjunctivae and EOM are normal    Neck: Normal range of motion  Neck supple  Cardiovascular: Normal rate, regular rhythm and normal heart sounds  Pulmonary/Chest: Effort normal and breath sounds normal    Musculoskeletal: Normal range of motion  He exhibits no edema  Neurological: He is alert and oriented to person, place, and time  Skin: Skin is warm, dry and intact  Rash (Multiple scabbed papules on the forehead and cheeks) noted  Psychiatric: He has a normal mood and affect  His behavior is normal  Judgment and thought content normal    Nursing note and vitals reviewed  Assessment/Plan:    Benign essential hypertension  Well controlled, labs stable, continue current medications  Moderate major depression ([address] )  Well controlled, continue Cymbalta  Intrinsic eczema  Uncontrolled without relief from doxycycline over four months so he will stop the doxycycline and follow up with Dermatology      Polycythemia vera ([facility] [address] )  Given the elevated iron an elevated hemoglobin I did advise the patient to call and make an appointment with his hematologist to discuss the results  Nicotine dependence  The patient is still smoking and does still want to quit  He did have Chantix which he stopped taking-he is requesting a refill  Erectile dysfunction due to diseases classified elsewhere  Well controlled on Cialis, I did refill his medication  Diagnoses and all orders for this visit:    Benign essential hypertension  -     amLODIPine (NORVASC) 10 mg tablet; Take 1 tablet (10 mg total) by mouth daily  -     hydrochlorothiazide (HYDRODIURIL) 25 mg tablet; Take 1 tablet (25 mg total) by mouth daily    Episode of recurrent major depressive disorder, unspecified depression episode severity (HCC)  -     DULoxetine (CYMBALTA) 60 mg delayed release capsule; Take 1 capsule (60 mg total) by mouth daily    Hemochromatosis type 1 (HCC)    Polycythemia vera (HCC)    Intrinsic eczema    Moderate major depression (HCC)    Cigarette nicotine dependence with withdrawal  -     varenicline (CHANTIX) 1 mg tablet; Take 1 tablet (1 mg total) by mouth 2 (two) times a day for 90 days    Erectile dysfunction due to diseases classified elsewhere  -     tadalafil (CIALIS) 5 MG tablet;  Take 1 tablet (5 mg total) by mouth daily as needed for erectile dysfunction

## 2019-01-02 NOTE — ASSESSMENT & PLAN NOTE
Given the elevated iron an elevated hemoglobin I did advise the patient to call and make an appointment with his hematologist to discuss the results

## 2019-01-02 NOTE — ASSESSMENT & PLAN NOTE
The patient is still smoking and does still want to quit  He did have Chantix which he stopped taking-he is requesting a refill

## 2019-01-02 NOTE — ASSESSMENT & PLAN NOTE
Uncontrolled without relief from doxycycline over four months so he will stop the doxycycline and follow up with Dermatology

## 2019-04-10 DIAGNOSIS — F33.9 EPISODE OF RECURRENT MAJOR DEPRESSIVE DISORDER, UNSPECIFIED DEPRESSION EPISODE SEVERITY (HCC): ICD-10-CM

## 2019-04-10 DIAGNOSIS — I10 BENIGN ESSENTIAL HYPERTENSION: ICD-10-CM

## 2019-04-10 RX ORDER — DULOXETIN HYDROCHLORIDE 60 MG/1
CAPSULE, DELAYED RELEASE ORAL
Qty: 90 CAPSULE | Refills: 0 | Status: SHIPPED | OUTPATIENT
Start: 2019-04-10 | End: 2019-05-12 | Stop reason: SDUPTHER

## 2019-04-10 RX ORDER — HYDROCHLOROTHIAZIDE 25 MG/1
TABLET ORAL
Qty: 90 TABLET | Refills: 0 | Status: SHIPPED | OUTPATIENT
Start: 2019-04-10 | End: 2019-05-12 | Stop reason: SDUPTHER

## 2019-05-12 DIAGNOSIS — F33.9 EPISODE OF RECURRENT MAJOR DEPRESSIVE DISORDER, UNSPECIFIED DEPRESSION EPISODE SEVERITY (HCC): ICD-10-CM

## 2019-05-12 DIAGNOSIS — I10 BENIGN ESSENTIAL HYPERTENSION: ICD-10-CM

## 2019-05-12 DIAGNOSIS — N52.1 ERECTILE DYSFUNCTION DUE TO DISEASES CLASSIFIED ELSEWHERE: ICD-10-CM

## 2019-05-13 RX ORDER — TADALAFIL 5 MG/1
5 TABLET ORAL DAILY PRN
Qty: 90 TABLET | Refills: 0 | Status: SHIPPED | OUTPATIENT
Start: 2019-05-13 | End: 2019-10-08 | Stop reason: SDUPTHER

## 2019-05-13 RX ORDER — AMLODIPINE BESYLATE 10 MG/1
10 TABLET ORAL DAILY
Qty: 90 TABLET | Refills: 0 | Status: SHIPPED | OUTPATIENT
Start: 2019-05-13 | End: 2020-01-23

## 2019-05-13 RX ORDER — HYDROCHLOROTHIAZIDE 25 MG/1
25 TABLET ORAL DAILY
Qty: 90 TABLET | Refills: 0 | Status: SHIPPED | OUTPATIENT
Start: 2019-05-13 | End: 2019-06-11 | Stop reason: ALTCHOICE

## 2019-05-13 RX ORDER — DULOXETIN HYDROCHLORIDE 60 MG/1
60 CAPSULE, DELAYED RELEASE ORAL DAILY
Qty: 90 CAPSULE | Refills: 0 | Status: SHIPPED | OUTPATIENT
Start: 2019-05-13 | End: 2019-06-11 | Stop reason: ALTCHOICE

## 2019-05-13 RX ORDER — DULOXETIN HYDROCHLORIDE 60 MG/1
60 CAPSULE, DELAYED RELEASE ORAL DAILY
Qty: 90 CAPSULE | Refills: 0 | Status: SHIPPED | OUTPATIENT
Start: 2019-05-13 | End: 2019-10-07 | Stop reason: SDUPTHER

## 2019-06-04 ENCOUNTER — TELEPHONE (OUTPATIENT)
Dept: FAMILY MEDICINE CLINIC | Facility: CLINIC | Age: 54
End: 2019-06-04

## 2019-06-04 DIAGNOSIS — Z01.810 PREOP CARDIOVASCULAR EXAM: Primary | ICD-10-CM

## 2019-06-05 ENCOUNTER — TELEPHONE (OUTPATIENT)
Dept: FAMILY MEDICINE CLINIC | Facility: CLINIC | Age: 54
End: 2019-06-05

## 2019-06-11 ENCOUNTER — OFFICE VISIT (OUTPATIENT)
Dept: FAMILY MEDICINE CLINIC | Facility: CLINIC | Age: 54
End: 2019-06-11
Payer: COMMERCIAL

## 2019-06-11 VITALS
DIASTOLIC BLOOD PRESSURE: 80 MMHG | TEMPERATURE: 99 F | WEIGHT: 218.5 LBS | HEART RATE: 91 BPM | SYSTOLIC BLOOD PRESSURE: 132 MMHG | OXYGEN SATURATION: 96 % | HEIGHT: 68 IN | BODY MASS INDEX: 33.12 KG/M2

## 2019-06-11 DIAGNOSIS — D45 POLYCYTHEMIA VERA (HCC): ICD-10-CM

## 2019-06-11 DIAGNOSIS — F17.213 CIGARETTE NICOTINE DEPENDENCE WITH WITHDRAWAL: Primary | ICD-10-CM

## 2019-06-11 DIAGNOSIS — Z13.29 SCREENING FOR THYROID DISORDER: ICD-10-CM

## 2019-06-11 DIAGNOSIS — I10 BENIGN ESSENTIAL HYPERTENSION: ICD-10-CM

## 2019-06-11 DIAGNOSIS — Z12.5 SCREENING FOR PROSTATE CANCER: ICD-10-CM

## 2019-06-11 DIAGNOSIS — Z13.220 SCREENING FOR LIPID DISORDERS: ICD-10-CM

## 2019-06-11 DIAGNOSIS — E83.110 HEMOCHROMATOSIS TYPE 1 (HCC): ICD-10-CM

## 2019-06-11 PROCEDURE — 99214 OFFICE O/P EST MOD 30 MIN: CPT | Performed by: FAMILY MEDICINE

## 2019-06-11 PROCEDURE — 3079F DIAST BP 80-89 MM HG: CPT | Performed by: FAMILY MEDICINE

## 2019-06-11 PROCEDURE — 4004F PT TOBACCO SCREEN RCVD TLK: CPT | Performed by: FAMILY MEDICINE

## 2019-06-11 PROCEDURE — 3008F BODY MASS INDEX DOCD: CPT | Performed by: FAMILY MEDICINE

## 2019-06-11 PROCEDURE — 3075F SYST BP GE 130 - 139MM HG: CPT | Performed by: FAMILY MEDICINE

## 2019-06-11 RX ORDER — VARENICLINE TARTRATE 1 MG/1
1 TABLET, FILM COATED ORAL 2 TIMES DAILY
Refills: 0 | COMMUNITY
Start: 2019-05-16 | End: 2019-08-26

## 2019-07-18 ENCOUNTER — HOSPITAL ENCOUNTER (OUTPATIENT)
Dept: CT IMAGING | Facility: HOSPITAL | Age: 54
Discharge: HOME | End: 2019-07-18
Attending: FAMILY MEDICINE
Payer: COMMERCIAL

## 2019-07-18 DIAGNOSIS — F17.213 CIGARETTE NICOTINE DEPENDENCE WITH WITHDRAWAL: ICD-10-CM

## 2019-08-24 ENCOUNTER — PATIENT MESSAGE (OUTPATIENT)
Dept: FAMILY MEDICINE CLINIC | Facility: CLINIC | Age: 54
End: 2019-08-24

## 2019-08-24 DIAGNOSIS — F17.213 CIGARETTE NICOTINE DEPENDENCE WITH WITHDRAWAL: ICD-10-CM

## 2019-08-26 RX ORDER — VARENICLINE TARTRATE 1 MG/1
1 TABLET, FILM COATED ORAL 2 TIMES DAILY
Qty: 180 TABLET | Refills: 0 | Status: SHIPPED | OUTPATIENT
Start: 2019-08-26 | End: 2019-10-24 | Stop reason: SDUPTHER

## 2019-10-07 DIAGNOSIS — I10 BENIGN ESSENTIAL HYPERTENSION: ICD-10-CM

## 2019-10-07 DIAGNOSIS — F33.9 EPISODE OF RECURRENT MAJOR DEPRESSIVE DISORDER, UNSPECIFIED DEPRESSION EPISODE SEVERITY (HCC): ICD-10-CM

## 2019-10-07 RX ORDER — HYDROCHLOROTHIAZIDE 25 MG/1
TABLET ORAL
Qty: 30 TABLET | Refills: 2 | Status: SHIPPED | OUTPATIENT
Start: 2019-10-07 | End: 2020-01-14 | Stop reason: ALTCHOICE

## 2019-10-07 RX ORDER — DULOXETIN HYDROCHLORIDE 60 MG/1
CAPSULE, DELAYED RELEASE ORAL
Qty: 30 CAPSULE | Refills: 2 | Status: SHIPPED | OUTPATIENT
Start: 2019-10-07 | End: 2019-12-12 | Stop reason: SDUPTHER

## 2019-10-08 DIAGNOSIS — N52.1 ERECTILE DYSFUNCTION DUE TO DISEASES CLASSIFIED ELSEWHERE: ICD-10-CM

## 2019-10-08 RX ORDER — TADALAFIL 5 MG/1
5 TABLET ORAL DAILY PRN
Qty: 30 TABLET | Refills: 2 | Status: SHIPPED | OUTPATIENT
Start: 2019-10-08 | End: 2020-01-23

## 2019-10-24 DIAGNOSIS — F17.213 CIGARETTE NICOTINE DEPENDENCE WITH WITHDRAWAL: ICD-10-CM

## 2019-10-24 RX ORDER — VARENICLINE TARTRATE 1 MG/1
1 TABLET, FILM COATED ORAL 2 TIMES DAILY
Qty: 180 TABLET | Refills: 0 | Status: SHIPPED | OUTPATIENT
Start: 2019-10-24 | End: 2020-01-23

## 2019-12-12 DIAGNOSIS — F33.9 EPISODE OF RECURRENT MAJOR DEPRESSIVE DISORDER, UNSPECIFIED DEPRESSION EPISODE SEVERITY (HCC): ICD-10-CM

## 2019-12-12 DIAGNOSIS — I10 BENIGN ESSENTIAL HYPERTENSION: Primary | ICD-10-CM

## 2019-12-13 RX ORDER — DULOXETIN HYDROCHLORIDE 60 MG/1
CAPSULE, DELAYED RELEASE ORAL
Qty: 30 CAPSULE | Refills: 0 | Status: SHIPPED | OUTPATIENT
Start: 2019-12-13 | End: 2020-01-23

## 2019-12-13 RX ORDER — HYDROCHLOROTHIAZIDE 25 MG/1
TABLET ORAL
Qty: 30 TABLET | Refills: 0 | Status: SHIPPED | OUTPATIENT
Start: 2019-12-13 | End: 2020-01-23

## 2020-01-14 ENCOUNTER — OFFICE VISIT (OUTPATIENT)
Dept: FAMILY MEDICINE CLINIC | Facility: CLINIC | Age: 55
End: 2020-01-14
Payer: COMMERCIAL

## 2020-01-14 VITALS
HEART RATE: 79 BPM | DIASTOLIC BLOOD PRESSURE: 86 MMHG | BODY MASS INDEX: 32.55 KG/M2 | OXYGEN SATURATION: 97 % | HEIGHT: 68 IN | SYSTOLIC BLOOD PRESSURE: 142 MMHG | WEIGHT: 214.75 LBS | TEMPERATURE: 98.9 F

## 2020-01-14 DIAGNOSIS — Z13.29 SCREENING FOR THYROID DISORDER: ICD-10-CM

## 2020-01-14 DIAGNOSIS — F99 INSOMNIA DUE TO OTHER MENTAL DISORDER: ICD-10-CM

## 2020-01-14 DIAGNOSIS — E83.110 HEMOCHROMATOSIS TYPE 1 (HCC): ICD-10-CM

## 2020-01-14 DIAGNOSIS — F51.05 INSOMNIA DUE TO OTHER MENTAL DISORDER: ICD-10-CM

## 2020-01-14 DIAGNOSIS — M16.11 PRIMARY OSTEOARTHRITIS OF RIGHT HIP: ICD-10-CM

## 2020-01-14 DIAGNOSIS — Z13.220 SCREENING FOR LIPID DISORDERS: ICD-10-CM

## 2020-01-14 DIAGNOSIS — I10 BENIGN ESSENTIAL HYPERTENSION: ICD-10-CM

## 2020-01-14 DIAGNOSIS — Z12.5 SCREENING FOR PROSTATE CANCER: ICD-10-CM

## 2020-01-14 DIAGNOSIS — Z01.818 PRE-OP EXAMINATION: ICD-10-CM

## 2020-01-14 DIAGNOSIS — Z00.00 ANNUAL PHYSICAL EXAM: Primary | ICD-10-CM

## 2020-01-14 PROCEDURE — 93000 ELECTROCARDIOGRAM COMPLETE: CPT | Performed by: FAMILY MEDICINE

## 2020-01-14 PROCEDURE — 1036F TOBACCO NON-USER: CPT | Performed by: FAMILY MEDICINE

## 2020-01-14 PROCEDURE — 3008F BODY MASS INDEX DOCD: CPT | Performed by: FAMILY MEDICINE

## 2020-01-14 PROCEDURE — 99214 OFFICE O/P EST MOD 30 MIN: CPT | Performed by: FAMILY MEDICINE

## 2020-01-14 PROCEDURE — 99396 PREV VISIT EST AGE 40-64: CPT | Performed by: FAMILY MEDICINE

## 2020-01-14 RX ORDER — MELOXICAM 15 MG/1
15 TABLET ORAL DAILY
COMMUNITY
Start: 2020-01-07

## 2020-01-14 RX ORDER — MIRTAZAPINE 30 MG/1
30-45 TABLET, FILM COATED ORAL
Qty: 30 TABLET | Refills: 0 | Status: SHIPPED | OUTPATIENT
Start: 2020-01-14

## 2020-01-14 NOTE — PATIENT INSTRUCTIONS

## 2020-01-14 NOTE — PROGRESS NOTES
[facility] PRE-OPERATIVE EVALUATION  [facility] PRACTICE           NAME: Rita Bach  AGE: 63 y o  SEX: male  : 1965     DATE: 2020    [facility] Pre-Operative Evaluation      Chief Complaint: Pre-operative Evaluation     Surgery: right hip replacement  Anticipated Date of Surgery: 2/10/20  Referring Provider: Courtney Prince     History of Present Illness:     Rita Bach is a 63 y o  male who presents to the office today for a preoperative consultation at the request of surgeon, Dr Courtney Prince, who plans on performing right hip replacement on 2/10/20  Planned anesthesia is general  Patient has a bleeding risk of: no recent abnormal bleeding  Patient does not have objections to receiving blood products if needed  Current anti-platelet/anti-coagulation medications that the patient is prescribed includes: none  Assessment of Chronic Conditions:   - Hypertension: well controlled on meds   - Hemachromatosis     Assessment of Cardiac Risk:  · Denies unstable or severe angina or MI in the last 6 weeks or history of stent placement in the last year   · Denies decompensated heart failure (e g  New onset heart failure, NYHA functional class IV heart failure, or worsening existing heart failure)  · Denies significant arrhythmias such as high grade AV block, symptomatic ventricular arrhythmia, newly recognized ventricular tachycardia, supraventricular tachycardia with resting heart rate >100, or symptomatic bradycardia  · Denies severe heart valve disease including aortic stenosis or symptomatic mitral stenosis     Exercise Capacity:  · Able to walk 4 blocks without symptoms?: Yes  · Able to walk 2 flights without symptoms?: Yes    Prior Anesthesia Reactions: No     Personal history of venous thromboembolic disease? No    History of steroid use for >2 weeks within last year?  No         Review of Systems:     Review of Systems   Constitutional: Negative for chills, fever and unexpected weight change  HENT: Negative for congestion, ear pain, hearing loss, postnasal drip, rhinorrhea and sore throat  Eyes: Negative for visual disturbance  Respiratory: Negative for cough and shortness of breath  Cardiovascular: Negative for chest pain  Gastrointestinal: Negative for abdominal pain, constipation and diarrhea  Genitourinary: Negative for difficulty urinating  Musculoskeletal: Negative for arthralgias and gait problem  Skin: Negative for rash  Neurological: Negative for light-headedness and headaches  Psychiatric/Behavioral: Negative for dysphoric mood and sleep disturbance  The patient is not nervous/anxious  Current Problem List:     Patient Active Problem List   Diagnosis    Benign essential hypertension    Moderate major depression ([facility] )    Diverticulosis    Hemochromatosis type 1 ([facility] )    Hypogonadism    Nicotine dependence    Polycythemia vera ([facility] )    Intrinsic eczema    Erectile dysfunction due to diseases classified elsewhere    Osteoarthritis of right hip       Allergies:      Allergies   Allergen Reactions    Penicillins Rash and Hives       Current Medications:       Current Outpatient Medications:     amLODIPine (NORVASC) 10 mg tablet, Take 1 tablet (10 mg total) by mouth daily, Disp: 90 tablet, Rfl: 0    DULoxetine (CYMBALTA) 60 mg delayed release capsule, TAKE 1 CAPSULE BY MOUTH EVERY DAY, Disp: 30 capsule, Rfl: 0    hydrochlorothiazide (HYDRODIURIL) 25 mg tablet, TAKE 1 TABLET BY MOUTH EVERY DAY, Disp: 30 tablet, Rfl: 0    meloxicam (MOBIC) 15 mg tablet, Take 15 mg by mouth daily, Disp: , Rfl:     tadalafil (CIALIS) 5 MG tablet, TAKE 1 TABLET (5 MG TOTAL) BY MOUTH DAILY AS NEEDED FOR ERECTILE DYSFUNCTION, Disp: 30 tablet, Rfl: 2    varenicline (CHANTIX) 1 mg tablet, Take 1 tablet (1 mg total) by mouth 2 (two) times a day, Disp: 180 tablet, Rfl: 0    mirtazapine (REMERON) 30 mg tablet, Take 1-1 5 tablets (30-45 mg total) by mouth daily at bedtime, Disp: 30 tablet, Rfl: 0    Past Medical History:       Past Medical History:   Diagnosis Date    Adenomatous polyp of colon     last assessed 05/23/17    Cubital tunnel syndrome     left,last assessed 05/23/17    Ksye-rectal abscess     last assessed 05/23/17    Temporomandibular joint disorder     last assessed 05/23/17        Past Surgical History:   Procedure Laterality Date    ARTHROPLASTY      Temporomandibular joint last assessed 05/23/17    KNEE ARTHROPLASTY      last assessed 05/23/17    LUMBAR DISC SURGERY      Osteophytectomy lumbar interspace last assessed 05/18/2017    LUMBAR LAMINECTOMY      last assessed 05/18/17    NEUROPLASTY / TRANSPOSITION MEDIAN NERVE AT CARPAL TUNNEL      last assessed 05/23/17    ORTHOPEDIC SURGERY      bone grafting,last assessed 05/23/17    OTHER SURGICAL HISTORY      open treat weight bearing distal tibial fibular fracture, last assessed 05/18/17        Family History   Problem Relation Age of Onset    Diabetes Father     Hypertension Father     Mitral valve prolapse Father     Colon cancer Paternal Grandfather         Social History     Socioeconomic History    Marital status: /Civil Union     Spouse name: Not on file    Number of children: Not on file    Years of education: Not on file    Highest education level: Not on file   Occupational History    Not on file   Social Needs    Financial resource strain: Not on file    Food insecurity:     Worry: Not on file     Inability: Not on file    Transportation needs:     Medical: Not on file     Non-medical: Not on file   Tobacco Use    Smoking status: Former Smoker    Smokeless tobacco: Never Used    Tobacco comment: PT STOPPED SMOKING ON 01/10/2020   Substance and Sexual Activity    Alcohol use:  Yes    Drug use: Not on file    Sexual activity: Not on file   Lifestyle    Physical activity:     Days per week: Not on file     Minutes per session: Not on file  Stress: Not on file   Relationships    Social connections:     Talks on phone: Not on file     Gets together: Not on file     Attends Judaism service: Not on file     Active member of club or organization: Not on file     Attends meetings of clubs or organizations: Not on file     Relationship status: Not on file    Intimate partner violence:     Fear of current or ex partner: Not on file     Emotionally abused: Not on file     Physically abused: Not on file     Forced sexual activity: Not on file   Other Topics Concern    Not on file   Social History Narrative    Daily caffeine consumption 2 servings per day    Travel foreign        Physical Exam:     /86   Pulse 79   Temp 98 9 °F (37 2 °C)   Ht 5' 8" (1 727 m)   Wt 97 4 kg (214 lb 12 oz)   SpO2 97%   BMI 32 65 kg/m²     Physical Exam   Constitutional: He is oriented to person, place, and time  He appears well-developed and well-nourished  HENT:   Head: Normocephalic and atraumatic  Right Ear: External ear normal    Left Ear: External ear normal    Nose: Nose normal    Mouth/Throat: Oropharynx is clear and moist    Eyes: Pupils are equal, round, and reactive to light  Conjunctivae and EOM are normal    Neck: Normal range of motion  Neck supple  No thyroid mass and no thyromegaly present  Cardiovascular: Normal rate, regular rhythm and normal heart sounds  Pulmonary/Chest: Effort normal and breath sounds normal    Abdominal: Soft  Normal appearance  There is no tenderness  Musculoskeletal: Normal range of motion  He exhibits no edema  Lymphadenopathy:     He has no cervical adenopathy  Right: No supraclavicular adenopathy present  Neurological: He is alert and oriented to person, place, and time  Skin: Skin is warm, dry and intact  Psychiatric: He has a normal mood and affect  His behavior is normal  Judgment and thought content normal    Nursing note and vitals reviewed         Data:     Pre-operative work-up    Laboratory Results: I have personally reviewed the pertinent laboratory results/reports  he did just have a CBC done a few days ago, hemoglobin was 15 5  I am having him get routine blood work and he has orders as well from Ortho  EKG: I have personally reviewed pertinent reports  and EKG done today in the office  Assessment & Recommendations:     1  Annual physical exam     2  Pre-op examination  POCT ECG   3  Primary osteoarthritis of right hip     4  Insomnia due to other mental disorder  mirtazapine (REMERON) 30 mg tablet   5  Benign essential hypertension  Comprehensive metabolic panel    CANCELED: CBC and differential   6  Hemochromatosis type 1 (HCC)  CANCELED: CBC and differential   7  Screening for lipid disorders  Lipid Panel with Direct LDL reflex   8  Screening for thyroid disorder  TSH, 3rd generation with Free T4 reflex   9  Screening for prostate cancer  PSA Total (Reflex To Free)       Pre-Op Evaluation Assessment  63 y o  male with planned surgery: right THR  Known risk factors for perioperative complications: None  Pre-Op Evaluation Plan  1  Further preoperative workup as follows:   - None; no further preoperative work-up is required    2  Medication Management/Recommendations:   - None, continue medication regimen including morning of surgery, with sip of water    3  Prophylaxis for cardiac events with perioperative beta-blockers: should be considered, specific regimen per anesthesia  4  Patient requires further consultation with: None    Clearance  Patient is CLEARED for surgery without any additional cardiac testing       Robby Horton MD  [address] [place] [address]  Phone#  [phone]  Fax#  [phone]

## 2020-01-14 NOTE — ASSESSMENT & PLAN NOTE
Stable, just had a hemoglobin a few days ago that was 15 5  He follows with hematology regularly and has blood letting when the Hb is high

## 2020-01-14 NOTE — ASSESSMENT & PLAN NOTE
Patient's blood pressure is slightly elevated today  He is going to monitor it and if it does not come down we will need to adjust his medications

## 2020-01-14 NOTE — PROGRESS NOTES
[facility] PRACT[facility]    NAME: Gage Sams  AGE: 63 y o  SEX: male  : 1965     DATE: 2020     Assessment and Plan:     Problem List Items Addressed This Visit        Cardiovascular and Mediastinum    Benign essential hypertension     Patient's blood pressure is slightly elevated today  He is going to monitor it and if it does not come down we will need to adjust his medications  Relevant Orders    Comprehensive metabolic panel       Other    Hemochromatosis type 1 ([address] )     Stable, just had a hemoglobin a few days ago that was 15 5  He follows with hematology regularly and has blood letting when the Hb is high  Other Visit Diagnoses     Annual physical exam    -  Primary    Screening for lipid disorders        Relevant Orders    Lipid Panel with Direct LDL reflex    Screening for thyroid disorder        Relevant Orders    TSH, 3rd generation with Free T4 reflex    Screening for prostate cancer        Relevant Orders    PSA Total (Reflex To Free)    Insomnia due to other mental disorder        Relevant Medications    mirtazapine (REMERON) 30 mg tablet          Immunizations and preventive care screenings were discussed with patient today  Appropriate education was printed on patient's after visit summary  Counseling:  Dental Health: discussed importance of regular tooth brushing, flossing, and dental visits  · Exercise: the importance of regular exercise/physical activity was discussed  Recommend exercise 3-5 times per week for at least 30 minutes  · He has had some really difficult nights lately, lots of stress at work-she has never really had any issues with sleeping and is wondering if he can try something for sleep  I am going to try mirtazapine 1st   If it makes him groggy in the morning or does not help I would switch to temazepam   He is hoping this is temporary  BMI Counseling:  Body mass index is 32 65 kg/m²  The BMI is above normal  Exercise recommendations include exercising 3-5 times per week  Return in 6 months (on 7/14/2020) for Medication Follow Up  Chief Complaint:     Chief Complaint   Patient presents with    Pre-op Exam     PT COMES IN TODAY FOR PRE-OP PHYSICAL  PT IS HAVING RIGHT HIP REPLACEMENT 02/10/2020      History of Present Illness:     Adult Annual Physical   Patient here for a comprehensive physical exam  The patient reports no problems  Diet and Physical Activity  · Diet/Nutrition: well balanced diet  · Exercise: walking  Depression Screening  PHQ-9 Depression Screening    PHQ-9:    Frequency of the following problems over the past two weeks:            General Health  · Sleep: sleeps poorly  Stress at work, anxiety  · Hearing: normal - bilateral   · Vision: most recent eye exam >1 year ago and wears glasses  · Dental: regular dental visits  Review of Systems:     Review of Systems   Constitutional: Negative for chills, fever and unexpected weight change  HENT: Negative for congestion, ear pain, hearing loss, postnasal drip, rhinorrhea and sore throat  Eyes: Negative for visual disturbance  Respiratory: Negative for cough and shortness of breath  Cardiovascular: Negative for chest pain  Gastrointestinal: Negative for abdominal pain, constipation and diarrhea  Genitourinary: Negative for difficulty urinating  Musculoskeletal: Negative for arthralgias and gait problem  Skin: Negative for rash  Really bad acne, just got off of accutane, sees derm in [place]   Neurological: Negative for light-headedness and headaches  Psychiatric/Behavioral: Negative for dysphoric mood and sleep disturbance  The patient is not nervous/anxious         Past Medical History:     Past Medical History:   Diagnosis Date    Adenomatous polyp of colon     last assessed 05/23/17    Cubital tunnel syndrome     left,last assessed 05/23/17    Skye-rectal abscess     last assessed 05/23/17    Temporomandibular joint disorder     last assessed 05/23/17      Past Surgical History:     Past Surgical History:   Procedure Laterality Date    ARTHROPLASTY      Temporomandibular joint last assessed 05/23/17    KNEE ARTHROPLASTY      last assessed 05/23/17    LUMBAR DISC SURGERY      Osteophytectomy lumbar interspace last assessed 05/18/2017    LUMBAR LAMINECTOMY      last assessed 05/18/17    NEUROPLASTY / TRANSPOSITION MEDIAN NERVE AT CARPAL TUNNEL      last assessed 05/23/17    ORTHOPEDIC SURGERY      bone grafting,last assessed 05/23/17    OTHER SURGICAL HISTORY      open treat weight bearing distal tibial fibular fracture, last assessed 05/18/17      Family History:     Family History   Problem Relation Age of Onset    Diabetes Father     Hypertension Father     Mitral valve prolapse Father     Colon cancer Paternal Grandfather       Social History:     Social History     Socioeconomic History    Marital status: /Civil Union     Spouse name: None    Number of children: None    Years of education: None    Highest education level: None   Occupational History    None   Social Needs    Financial resource strain: None    Food insecurity:     Worry: None     Inability: None    Transportation needs:     Medical: None     Non-medical: None   Tobacco Use    Smoking status: Former Smoker    Smokeless tobacco: Never Used    Tobacco comment: PT STOPPED SMOKING ON 01/10/2020   Substance and Sexual Activity    Alcohol use:  Yes    Drug use: None    Sexual activity: None   Lifestyle    Physical activity:     Days per week: None     Minutes per session: None    Stress: None   Relationships    Social connections:     Talks on phone: None     Gets together: None     Attends Jew service: None     Active member of club or organization: None     Attends meetings of clubs or organizations: None     Relationship status: None    Intimate partner violence:     Fear of current or ex partner: None     Emotionally abused: None     Physically abused: None     Forced sexual activity: None   Other Topics Concern    None   Social History Narrative    Daily caffeine consumption 2 servings per day    Travel foreign      Current Medications:     Current Outpatient Medications   Medication Sig Dispense Refill    amLODIPine (NORVASC) 10 mg tablet Take 1 tablet (10 mg total) by mouth daily 90 tablet 0    DULoxetine (CYMBALTA) 60 mg delayed release capsule TAKE 1 CAPSULE BY MOUTH EVERY DAY 30 capsule 0    hydrochlorothiazide (HYDRODIURIL) 25 mg tablet TAKE 1 TABLET BY MOUTH EVERY DAY 30 tablet 0    meloxicam (MOBIC) 15 mg tablet Take 15 mg by mouth daily      tadalafil (CIALIS) 5 MG tablet TAKE 1 TABLET (5 MG TOTAL) BY MOUTH DAILY AS NEEDED FOR ERECTILE DYSFUNCTION 30 tablet 2    varenicline (CHANTIX) 1 mg tablet Take 1 tablet (1 mg total) by mouth 2 (two) times a day 180 tablet 0    mirtazapine (REMERON) 30 mg tablet Take 1-1 5 tablets (30-45 mg total) by mouth daily at bedtime 30 tablet 0     No current facility-administered medications for this visit  Allergies: Allergies   Allergen Reactions    Penicillins Rash and Hives      Physical Exam:     /86   Pulse 79   Temp 98 9 °F (37 2 °C)   Ht 5' 8" (1 727 m)   Wt 97 4 kg (214 lb 12 oz)   SpO2 97%   BMI 32 65 kg/m²     Physical Exam   Constitutional: He is oriented to person, place, and time  He appears well-developed and well-nourished  HENT:   Head: Normocephalic and atraumatic  Right Ear: External ear normal    Left Ear: External ear normal    Nose: Nose normal    Mouth/Throat: Oropharynx is clear and moist    Eyes: Pupils are equal, round, and reactive to light  Conjunctivae and EOM are normal    Neck: Normal range of motion  Neck supple  No thyroid mass and no thyromegaly present  Cardiovascular: Normal rate, regular rhythm and normal heart sounds     Pulmonary/Chest: Effort normal and breath sounds normal    Abdominal: Soft  Normal appearance  There is no tenderness  Musculoskeletal: Normal range of motion  He exhibits no edema  Lymphadenopathy:     He has no cervical adenopathy  Right: No supraclavicular adenopathy present  Neurological: He is alert and oriented to person, place, and time  Skin: Skin is warm, dry and intact  Psychiatric: He has a normal mood and affect  His behavior is normal  Judgment and thought content normal    Nursing note and vitals reviewed        MD Kayden Doss

## 2020-01-18 LAB
ALBUMIN SERPL-MCNC: 4.5 G/DL (ref 3.5–5.5)
ALBUMIN/GLOB SERPL: 1.7 {RATIO} (ref 1.2–2.2)
ALP SERPL-CCNC: 100 IU/L (ref 39–117)
ALT SERPL-CCNC: 65 IU/L (ref 0–44)
AST SERPL-CCNC: 68 IU/L (ref 0–40)
BASOPHILS # BLD AUTO: 0 X10E3/UL (ref 0–0.2)
BASOPHILS NFR BLD AUTO: 1 %
BILIRUB SERPL-MCNC: 0.6 MG/DL (ref 0–1.2)
BUN SERPL-MCNC: 14 MG/DL (ref 6–24)
BUN/CREAT SERPL: 12 (ref 9–20)
CALCIUM SERPL-MCNC: 9.6 MG/DL (ref 8.7–10.2)
CHLORIDE SERPL-SCNC: 97 MMOL/L (ref 96–106)
CHOLEST SERPL-MCNC: 174 MG/DL (ref 100–199)
CO2 SERPL-SCNC: 27 MMOL/L (ref 20–29)
CREAT SERPL-MCNC: 1.18 MG/DL (ref 0.76–1.27)
EOSINOPHIL # BLD AUTO: 0.1 X10E3/UL (ref 0–0.4)
EOSINOPHIL NFR BLD AUTO: 2 %
ERYTHROCYTE [DISTWIDTH] IN BLOOD BY AUTOMATED COUNT: 15.7 % (ref 11.6–15.4)
GLOBULIN SER-MCNC: 2.7 G/DL (ref 1.5–4.5)
GLUCOSE SERPL-MCNC: 103 MG/DL (ref 65–99)
HCT VFR BLD AUTO: 47.9 % (ref 37.5–51)
HDLC SERPL-MCNC: 38 MG/DL
HGB BLD-MCNC: 16 G/DL (ref 13–17.7)
IMM GRANULOCYTES # BLD: 0 X10E3/UL (ref 0–0.1)
IMM GRANULOCYTES NFR BLD: 0 %
LDLC SERPL CALC-MCNC: 106 MG/DL (ref 0–99)
LDLC/HDLC SERPL: 2.8 RATIO (ref 0–3.6)
LYMPHOCYTES # BLD AUTO: 1.7 X10E3/UL (ref 0.7–3.1)
LYMPHOCYTES NFR BLD AUTO: 27 %
MCH RBC QN AUTO: 29.5 PG (ref 26.6–33)
MCHC RBC AUTO-ENTMCNC: 33.4 G/DL (ref 31.5–35.7)
MCV RBC AUTO: 88 FL (ref 79–97)
MONOCYTES # BLD AUTO: 0.6 X10E3/UL (ref 0.1–0.9)
MONOCYTES NFR BLD AUTO: 10 %
NEUTROPHILS # BLD AUTO: 3.7 X10E3/UL (ref 1.4–7)
NEUTROPHILS NFR BLD AUTO: 60 %
PLATELET # BLD AUTO: 205 X10E3/UL (ref 150–450)
POTASSIUM SERPL-SCNC: 3.4 MMOL/L (ref 3.5–5.2)
PROT SERPL-MCNC: 7.2 G/DL (ref 6–8.5)
PSA SERPL-MCNC: 0.9 NG/ML (ref 0–4)
RBC # BLD AUTO: 5.42 X10E6/UL (ref 4.14–5.8)
SL AMB EGFR AFRICAN AMERICAN: 79 ML/MIN/1.73
SL AMB EGFR NON AFRICAN AMERICAN: 69 ML/MIN/1.73
SL AMB REFLEX CRITERIA: NORMAL
SL AMB VLDL CHOLESTEROL CALC: 30 MG/DL (ref 5–40)
SODIUM SERPL-SCNC: 141 MMOL/L (ref 134–144)
TRIGL SERPL-MCNC: 152 MG/DL (ref 0–149)
TSH SERPL DL<=0.005 MIU/L-ACNC: 1.34 UIU/ML (ref 0.45–4.5)
WBC # BLD AUTO: 6.2 X10E3/UL (ref 3.4–10.8)

## 2020-01-20 DIAGNOSIS — R73.01 ELEVATED FASTING GLUCOSE: ICD-10-CM

## 2020-01-20 DIAGNOSIS — R79.89 ELEVATED LFTS: Primary | ICD-10-CM

## 2020-01-21 ENCOUNTER — PATIENT MESSAGE (OUTPATIENT)
Dept: FAMILY MEDICINE CLINIC | Facility: CLINIC | Age: 55
End: 2020-01-21

## 2020-01-21 DIAGNOSIS — F99 INSOMNIA DUE TO OTHER MENTAL DISORDER: Primary | ICD-10-CM

## 2020-01-21 DIAGNOSIS — F51.05 INSOMNIA DUE TO OTHER MENTAL DISORDER: Primary | ICD-10-CM

## 2020-01-22 RX ORDER — TEMAZEPAM 15 MG/1
15-30 CAPSULE ORAL
Qty: 30 CAPSULE | Refills: 0 | Status: SHIPPED | OUTPATIENT
Start: 2020-01-22 | End: 2020-04-20 | Stop reason: SDUPTHER

## 2020-01-22 NOTE — TELEPHONE ENCOUNTER
From: Susanna Huston  To: Alsia Pate MD  Sent: 1/21/2020 3:13 PM EST  Subject: Prescription Question    Hi Dr Michelle Melvin,  I don't feel that the sleep aid you prescribed me is working that well, is there another I can try? I don't think this will be long term as I've never had problems before, just under a lot of stress right now at work    Thanks   Christopher Da Silva

## 2020-01-23 DIAGNOSIS — F33.9 EPISODE OF RECURRENT MAJOR DEPRESSIVE DISORDER, UNSPECIFIED DEPRESSION EPISODE SEVERITY (HCC): ICD-10-CM

## 2020-01-23 DIAGNOSIS — I10 BENIGN ESSENTIAL HYPERTENSION: ICD-10-CM

## 2020-01-23 DIAGNOSIS — N52.1 ERECTILE DYSFUNCTION DUE TO DISEASES CLASSIFIED ELSEWHERE: ICD-10-CM

## 2020-01-23 DIAGNOSIS — F17.213 CIGARETTE NICOTINE DEPENDENCE WITH WITHDRAWAL: ICD-10-CM

## 2020-01-23 RX ORDER — DULOXETIN HYDROCHLORIDE 60 MG/1
CAPSULE, DELAYED RELEASE ORAL
Qty: 90 CAPSULE | Refills: 1 | Status: SHIPPED | OUTPATIENT
Start: 2020-01-23 | End: 2020-07-10

## 2020-01-23 RX ORDER — TADALAFIL 5 MG/1
5 TABLET ORAL DAILY PRN
Qty: 90 TABLET | Refills: 1 | Status: SHIPPED | OUTPATIENT
Start: 2020-01-23 | End: 2020-07-10

## 2020-01-23 RX ORDER — AMLODIPINE BESYLATE 10 MG/1
TABLET ORAL
Qty: 90 TABLET | Refills: 1 | Status: SHIPPED | OUTPATIENT
Start: 2020-01-23 | End: 2020-07-10

## 2020-01-23 RX ORDER — VARENICLINE TARTRATE 1 MG/1
1 TABLET, FILM COATED ORAL 2 TIMES DAILY
Qty: 180 TABLET | Refills: 0 | Status: SHIPPED | OUTPATIENT
Start: 2020-01-23 | End: 2020-05-22

## 2020-01-23 RX ORDER — HYDROCHLOROTHIAZIDE 25 MG/1
TABLET ORAL
Qty: 90 TABLET | Refills: 1 | Status: SHIPPED | OUTPATIENT
Start: 2020-01-23 | End: 2020-07-10

## 2020-04-20 DIAGNOSIS — F99 INSOMNIA DUE TO OTHER MENTAL DISORDER: ICD-10-CM

## 2020-04-20 DIAGNOSIS — F51.05 INSOMNIA DUE TO OTHER MENTAL DISORDER: ICD-10-CM

## 2020-04-20 RX ORDER — TEMAZEPAM 15 MG/1
15-30 CAPSULE ORAL
Qty: 30 CAPSULE | Refills: 0 | Status: SHIPPED | OUTPATIENT
Start: 2020-04-20 | End: 2020-05-21 | Stop reason: SDUPTHER

## 2020-05-21 DIAGNOSIS — F99 INSOMNIA DUE TO OTHER MENTAL DISORDER: ICD-10-CM

## 2020-05-21 DIAGNOSIS — F17.213 CIGARETTE NICOTINE DEPENDENCE WITH WITHDRAWAL: ICD-10-CM

## 2020-05-21 DIAGNOSIS — F51.05 INSOMNIA DUE TO OTHER MENTAL DISORDER: ICD-10-CM

## 2020-05-22 RX ORDER — VARENICLINE TARTRATE 1 MG/1
TABLET, FILM COATED ORAL
Qty: 60 TABLET | Refills: 2 | Status: SHIPPED | OUTPATIENT
Start: 2020-05-22

## 2020-05-22 RX ORDER — TEMAZEPAM 30 MG/1
30 CAPSULE ORAL
Qty: 90 CAPSULE | Refills: 0 | Status: SHIPPED | OUTPATIENT
Start: 2020-05-22 | End: 2020-08-30 | Stop reason: SDUPTHER

## 2020-07-10 DIAGNOSIS — I10 BENIGN ESSENTIAL HYPERTENSION: ICD-10-CM

## 2020-07-10 DIAGNOSIS — F33.9 EPISODE OF RECURRENT MAJOR DEPRESSIVE DISORDER, UNSPECIFIED DEPRESSION EPISODE SEVERITY (HCC): ICD-10-CM

## 2020-07-10 DIAGNOSIS — N52.1 ERECTILE DYSFUNCTION DUE TO DISEASES CLASSIFIED ELSEWHERE: ICD-10-CM

## 2020-07-10 RX ORDER — AMLODIPINE BESYLATE 10 MG/1
TABLET ORAL
Qty: 30 TABLET | Refills: 5 | Status: SHIPPED | OUTPATIENT
Start: 2020-07-10

## 2020-07-10 RX ORDER — TADALAFIL 5 MG/1
5 TABLET ORAL DAILY PRN
Qty: 30 TABLET | Refills: 5 | Status: SHIPPED | OUTPATIENT
Start: 2020-07-10

## 2020-07-10 RX ORDER — HYDROCHLOROTHIAZIDE 25 MG/1
TABLET ORAL
Qty: 30 TABLET | Refills: 5 | Status: SHIPPED | OUTPATIENT
Start: 2020-07-10

## 2020-07-10 RX ORDER — DULOXETIN HYDROCHLORIDE 60 MG/1
CAPSULE, DELAYED RELEASE ORAL
Qty: 30 CAPSULE | Refills: 5 | Status: SHIPPED | OUTPATIENT
Start: 2020-07-10

## 2020-07-14 ENCOUNTER — OFFICE VISIT (OUTPATIENT)
Dept: FAMILY MEDICINE CLINIC | Facility: CLINIC | Age: 55
End: 2020-07-14
Payer: COMMERCIAL

## 2020-07-14 VITALS
TEMPERATURE: 98.8 F | HEIGHT: 68 IN | WEIGHT: 208.5 LBS | HEART RATE: 82 BPM | DIASTOLIC BLOOD PRESSURE: 78 MMHG | BODY MASS INDEX: 31.6 KG/M2 | OXYGEN SATURATION: 98 % | SYSTOLIC BLOOD PRESSURE: 136 MMHG

## 2020-07-14 DIAGNOSIS — R79.89 ELEVATED LFTS: ICD-10-CM

## 2020-07-14 DIAGNOSIS — E83.110 HEMOCHROMATOSIS TYPE 1 (HCC): ICD-10-CM

## 2020-07-14 DIAGNOSIS — R73.01 ELEVATED FASTING GLUCOSE: ICD-10-CM

## 2020-07-14 DIAGNOSIS — I10 BENIGN ESSENTIAL HYPERTENSION: Primary | ICD-10-CM

## 2020-07-14 DIAGNOSIS — F17.213 CIGARETTE NICOTINE DEPENDENCE WITH WITHDRAWAL: ICD-10-CM

## 2020-07-14 PROCEDURE — 1036F TOBACCO NON-USER: CPT | Performed by: FAMILY MEDICINE

## 2020-07-14 PROCEDURE — 3008F BODY MASS INDEX DOCD: CPT | Performed by: FAMILY MEDICINE

## 2020-07-14 PROCEDURE — 3078F DIAST BP <80 MM HG: CPT | Performed by: FAMILY MEDICINE

## 2020-07-14 PROCEDURE — 99214 OFFICE O/P EST MOD 30 MIN: CPT | Performed by: FAMILY MEDICINE

## 2020-07-14 PROCEDURE — 3075F SYST BP GE 130 - 139MM HG: CPT | Performed by: FAMILY MEDICINE

## 2020-07-14 NOTE — ASSESSMENT & PLAN NOTE
Initially the patient's blood pressure was high but on recheck it came down to 136/78  He will continue his amlodipine and hydrochlorothiazide  He is going to go ahead and get his labs done now due to the elevated liver enzymes in sugar  I will follow-up with him after these lab results are back  He is not due for routine physical until January so if he does indeed moved to [place] in September he will seek out a new physician there and get his physical and lab work around that time

## 2020-07-14 NOTE — ASSESSMENT & PLAN NOTE
The patient does get routine care through Hematology so he will need to seek out a hematologist when he moves to [place] and he is aware of this

## 2020-07-14 NOTE — ASSESSMENT & PLAN NOTE
Tobacco Cessation Counseling: Tobacco cessation counseling and education was provided  The patient is sincerely urged to quit consumption of tobacco  He is ready to quit tobacco  The numerous health risks of tobacco consumption were discussed  He is already taking Chantix, he has gone on off of it  He is currently not smoking which is excellent and I encouraged he continue with cessation

## 2020-07-14 NOTE — PROGRESS NOTES
Subjective:   Chief Complaint   Patient presents with    Follow-up     Pt here today for his 6 month follow-up on BP  FBW complete 1/17/2020  Patient ID: Manuelito Ritter is a 63 y o  male  The patient is here today to follow-up on his hypertension  He was supposed to have repeat lab work done for elevated liver enzymes and elevated fasting glucose but at the time when he was supposed to have it done was right at the beginning of the [facility]  He has not had them done  He has not been having any problems with chest pain, shortness of breath, headaches  He has been taking his medications as prescribed and generally feeling well  He is actually planning to move to [place] in September  He is not smoking now, it has been one and half weeks  He seems to go back and forth but continues to smoke less than last  He is very motivated and wants to quit and continues to try      The following portions of the patient's history were reviewed and updated as appropriate: allergies, current medications, past family history, past medical history, past social history, past surgical history and problem list     Review of Systems      Objective:  Vitals:    07/14/20 1202 07/14/20 1214   BP: 140/98 136/78   BP Location: Left arm    Patient Position: Sitting    Cuff Size: Large    Pulse: 82    Temp: 98 8 °F (37 1 °C)    TempSrc: Tympanic    SpO2: 98%    Weight: 94 6 kg (208 lb 8 oz)    Height: 5' 8" (1 727 m)       Physical Exam   Constitutional: He is oriented to person, place, and time  He appears well-developed and well-nourished  No distress  Neurological: He is alert and oriented to person, place, and time  No cranial nerve deficit  Coordination normal    Skin: Skin is warm and dry  No rash noted  He is not diaphoretic  No erythema  Psychiatric: He has a normal mood and affect   His behavior is normal  Judgment and thought content normal        Diabetic Foot Exam      Assessment/Plan:    Benign essential hypertension  Initially the patient's blood pressure was high but on recheck it came down to 136/78  He will continue his amlodipine and hydrochlorothiazide  He is going to go ahead and get his labs done now due to the elevated liver enzymes in sugar  I will follow-up with him after these lab results are back  He is not due for routine physical until January so if he does indeed moved to [place] in September he will seek out a new physician there and get his physical and lab work around that time  Nicotine dependence  Tobacco Cessation Counseling: Tobacco cessation counseling and education was provided  The patient is sincerely urged to quit consumption of tobacco  He is ready to quit tobacco  The numerous health risks of tobacco consumption were discussed  He is already taking Chantix, he has gone on off of it  He is currently not smoking which is excellent and I encouraged he continue with cessation  Polycythemia vera ([facility] [address] )  The patient does get routine care through Hematology so he will need to seek out a hematologist when he moves to [place] and he is aware of this         Diagnoses and all orders for this visit:    Benign essential hypertension    Cigarette nicotine dependence with withdrawal    Hemochromatosis type 1 (HCC)    Elevated LFTs    Elevated fasting glucose

## 2020-07-25 LAB
ALBUMIN SERPL-MCNC: 4.8 G/DL (ref 3.8–4.9)
ALBUMIN/GLOB SERPL: 2.3 {RATIO} (ref 1.2–2.2)
ALP SERPL-CCNC: 100 IU/L (ref 39–117)
ALT SERPL-CCNC: 47 IU/L (ref 0–44)
AST SERPL-CCNC: 38 IU/L (ref 0–40)
BILIRUB SERPL-MCNC: 1.1 MG/DL (ref 0–1.2)
BUN SERPL-MCNC: 15 MG/DL (ref 6–24)
BUN/CREAT SERPL: 12 (ref 9–20)
CALCIUM SERPL-MCNC: 9.8 MG/DL (ref 8.7–10.2)
CHLORIDE SERPL-SCNC: 98 MMOL/L (ref 96–106)
CO2 SERPL-SCNC: 24 MMOL/L (ref 20–29)
CREAT SERPL-MCNC: 1.21 MG/DL (ref 0.76–1.27)
GLOBULIN SER-MCNC: 2.1 G/DL (ref 1.5–4.5)
GLUCOSE SERPL-MCNC: 110 MG/DL (ref 65–99)
HAV IGM SERPL QL IA: NEGATIVE
HBA1C MFR BLD: 4.8 % (ref 4.8–5.6)
HBV CORE IGM SERPL QL IA: NEGATIVE
HBV SURFACE AG SERPL QL IA: NEGATIVE
HCV AB S/CO SERPL IA: 0.2 S/CO RATIO (ref 0–0.9)
POTASSIUM SERPL-SCNC: 3.3 MMOL/L (ref 3.5–5.2)
PROT SERPL-MCNC: 6.9 G/DL (ref 6–8.5)
SL AMB EGFR AFRICAN AMERICAN: 77 ML/MIN/1.73
SL AMB EGFR NON AFRICAN AMERICAN: 67 ML/MIN/1.73
SODIUM SERPL-SCNC: 141 MMOL/L (ref 134–144)

## 2020-08-30 DIAGNOSIS — F51.05 INSOMNIA DUE TO OTHER MENTAL DISORDER: ICD-10-CM

## 2020-08-30 DIAGNOSIS — F99 INSOMNIA DUE TO OTHER MENTAL DISORDER: ICD-10-CM

## 2020-08-31 RX ORDER — TEMAZEPAM 30 MG/1
30 CAPSULE ORAL
Qty: 30 CAPSULE | Refills: 2 | Status: SHIPPED | OUTPATIENT
Start: 2020-08-31